# Patient Record
Sex: MALE | Race: WHITE | NOT HISPANIC OR LATINO | Employment: OTHER | URBAN - METROPOLITAN AREA
[De-identification: names, ages, dates, MRNs, and addresses within clinical notes are randomized per-mention and may not be internally consistent; named-entity substitution may affect disease eponyms.]

---

## 2023-05-18 ENCOUNTER — OFFICE VISIT (OUTPATIENT)
Dept: PRIMARY CARE | Facility: CLINIC | Age: 88
End: 2023-05-18
Payer: MEDICARE

## 2023-05-18 VITALS
DIASTOLIC BLOOD PRESSURE: 72 MMHG | OXYGEN SATURATION: 98 % | TEMPERATURE: 98.4 F | HEART RATE: 91 BPM | BODY MASS INDEX: 25.54 KG/M2 | WEIGHT: 182.4 LBS | SYSTOLIC BLOOD PRESSURE: 139 MMHG | HEIGHT: 71 IN

## 2023-05-18 DIAGNOSIS — I65.23 BILATERAL CAROTID ARTERY STENOSIS: ICD-10-CM

## 2023-05-18 DIAGNOSIS — I48.0 PAROXYSMAL ATRIAL FIBRILLATION (MULTI): ICD-10-CM

## 2023-05-18 DIAGNOSIS — I10 ESSENTIAL HYPERTENSION: ICD-10-CM

## 2023-05-18 DIAGNOSIS — I50.32 CHRONIC DIASTOLIC CONGESTIVE HEART FAILURE (MULTI): ICD-10-CM

## 2023-05-18 DIAGNOSIS — H53.8 BLURRY VISION, RIGHT EYE: Primary | ICD-10-CM

## 2023-05-18 DIAGNOSIS — N18.4 STAGE 4 CHRONIC KIDNEY DISEASE (MULTI): ICD-10-CM

## 2023-05-18 PROBLEM — U07.1 COVID-19: Status: ACTIVE | Noted: 2022-07-19

## 2023-05-18 PROBLEM — I71.40 AAA (ABDOMINAL AORTIC ANEURYSM) (CMS-HCC): Status: ACTIVE | Noted: 2023-05-18

## 2023-05-18 PROBLEM — D50.9 ANEMIA, IRON DEFICIENCY: Status: ACTIVE | Noted: 2023-05-18

## 2023-05-18 PROBLEM — R55 SYNCOPE: Status: ACTIVE | Noted: 2022-07-18

## 2023-05-18 PROBLEM — E78.00 HYPERCHOLESTEROLEMIA: Status: ACTIVE | Noted: 2023-05-18

## 2023-05-18 PROBLEM — E55.9 VITAMIN D DEFICIENCY: Status: ACTIVE | Noted: 2023-05-18

## 2023-05-18 PROBLEM — I50.30: Status: ACTIVE | Noted: 2022-07-19

## 2023-05-18 PROBLEM — I72.3 ANEURYSM OF LEFT COMMON ILIAC ARTERY (CMS-HCC): Status: ACTIVE | Noted: 2023-05-18

## 2023-05-18 PROBLEM — C61: Status: ACTIVE | Noted: 2023-05-18

## 2023-05-18 PROBLEM — K44.9 HIATAL HERNIA: Status: ACTIVE | Noted: 2023-05-18

## 2023-05-18 PROBLEM — I45.10 COMPLETE RIGHT BUNDLE BRANCH BLOCK: Status: ACTIVE | Noted: 2023-05-18

## 2023-05-18 PROCEDURE — 1159F MED LIST DOCD IN RCRD: CPT | Performed by: FAMILY MEDICINE

## 2023-05-18 PROCEDURE — 1157F ADVNC CARE PLAN IN RCRD: CPT | Performed by: FAMILY MEDICINE

## 2023-05-18 PROCEDURE — 1036F TOBACCO NON-USER: CPT | Performed by: FAMILY MEDICINE

## 2023-05-18 PROCEDURE — 1160F RVW MEDS BY RX/DR IN RCRD: CPT | Performed by: FAMILY MEDICINE

## 2023-05-18 PROCEDURE — 3078F DIAST BP <80 MM HG: CPT | Performed by: FAMILY MEDICINE

## 2023-05-18 PROCEDURE — 3075F SYST BP GE 130 - 139MM HG: CPT | Performed by: FAMILY MEDICINE

## 2023-05-18 PROCEDURE — 99214 OFFICE O/P EST MOD 30 MIN: CPT | Performed by: FAMILY MEDICINE

## 2023-05-18 RX ORDER — FUROSEMIDE 40 MG/1
TABLET ORAL
COMMUNITY
Start: 2018-01-15 | End: 2024-05-24 | Stop reason: SDUPTHER

## 2023-05-18 RX ORDER — LISINOPRIL 2.5 MG/1
TABLET ORAL
COMMUNITY
Start: 2018-01-15

## 2023-05-18 RX ORDER — LACTULOSE 10 G/15ML
SOLUTION ORAL; RECTAL
COMMUNITY
Start: 2021-05-13 | End: 2023-12-22

## 2023-05-18 RX ORDER — ATORVASTATIN CALCIUM 40 MG/1
TABLET, FILM COATED ORAL
COMMUNITY
Start: 2017-12-05 | End: 2024-02-08 | Stop reason: SDUPTHER

## 2023-05-18 RX ORDER — NEOMYCIN SULFATE, POLYMYXIN B SULFATE, HYDROCORTISONE 3.5; 10000; 1 MG/ML; [USP'U]/ML; MG/ML
SOLUTION/ DROPS AURICULAR (OTIC) 4 TIMES DAILY
COMMUNITY
Start: 2022-08-30 | End: 2023-12-22

## 2023-05-18 RX ORDER — METOPROLOL SUCCINATE 50 MG/1
TABLET, EXTENDED RELEASE ORAL
COMMUNITY
Start: 2018-01-15 | End: 2024-02-20 | Stop reason: SDUPTHER

## 2023-05-18 ASSESSMENT — LIFESTYLE VARIABLES
HOW OFTEN DO YOU HAVE SIX OR MORE DRINKS ON ONE OCCASION: NEVER
HOW OFTEN DO YOU HAVE A DRINK CONTAINING ALCOHOL: NEVER

## 2023-05-18 ASSESSMENT — PATIENT HEALTH QUESTIONNAIRE - PHQ9
2. FEELING DOWN, DEPRESSED OR HOPELESS: NOT AT ALL
SUM OF ALL RESPONSES TO PHQ9 QUESTIONS 1 & 2: 0
1. LITTLE INTEREST OR PLEASURE IN DOING THINGS: NOT AT ALL

## 2023-05-18 NOTE — PROGRESS NOTES
Subjective   Patient ID: Jase Amato is a 87 y.o. male who presents for Follow-up (Patient is here for 6 month follow up and to discuss ED visit).  HPI  87-year-old male for 6-month follow-up.  Multiple complaints.  Due for refill on medication for hypertension.  Also having vision changes in his right eye.  Review of Systems  Constitutional: no chills, no fever and no night sweats.   Eyes: right side blurred vision.  ENT: no hearing loss, no nasal congestion, no nasal discharge, no hoarseness and no sore throat.   Cardiovascular: no chest pain, no intermittent leg claudication, no lower extremity edema, no palpitations and no syncope.   Respiratory: no cough, no shortness of breath during exertion, no shortness of breath at rest and no wheezing.   Gastrointestinal: no abdominal pain, no blood in stools, no constipation, no diarrhea, no melena, no nausea, no rectal pain and no vomiting.   Genitourinary: no dysuria, no change in urinary frequency, no urinary hesitancy and no feelings of urinary urgency.   Neurological: no difficulty walking, no headache, no limb weakness, no numbness and no tingling.   Psychiatric: no anxiety, no depression, no anhedonia and no substance use disorders.   Endocrine: no recent weight gain and no recent weight loss.   Hematologic/Lymphatic: no tendency for easy bruising and no swollen glands.  Visit Vitals  /72 (BP Location: Right arm, Patient Position: Sitting, BP Cuff Size: Adult)   Pulse 91   Temp 36.9 °C (98.4 °F) (Temporal)        Objective   Physical Exam  Constitutional: Alert and in no acute distress. Well developed, well nourished.   Eyes: Pupils were equal in size, round, reactive to light (PERRL) with normal accommodation and extraocular movements intact (EOMI). Ophthalmoscopic examination: Normal.   Ears, Nose, Mouth, and Throat: External inspection of ears and nose: Normal. Otoscopic examination: Normal. Lips, teeth, and gums: Normal. Oropharynx: Normal.    Neck: No neck mass was observed. Supple. Thyroid not enlarged and there were no palpable thyroid nodules.   Cardiovascular: Heart rate and rhythm were normal, normal S1 and S2, no gallops, no murmurs and no pericardial rub. Carotid pulses: Normal with no bruits. Abdominal aorta: Normal. Pedal pulses: Normal. No peripheral edema.   Pulmonary: No respiratory distress. Clear bilateral breath sounds.   Abdomen: Soft nontender; no abdominal mass palpated. Normal bowel sounds. No organomegaly.   Skin: Normal skin color and pigmentation, normal skin turgor, and no rash.   Psychiatric: Judgment and insight: Intact. Mood and affect: Normal.  Assessment/Plan   Problem List Items Addressed This Visit          Circulatory    Bilateral carotid artery stenosis    Chronic diastolic congestive heart failure (CMS/HCC)    Relevant Medications    metoprolol succinate XL (Toprol-XL) 50 mg 24 hr tablet    Paroxysmal atrial fibrillation (CMS/HCC)    Relevant Medications    metoprolol succinate XL (Toprol-XL) 50 mg 24 hr tablet    Essential hypertension       Genitourinary    Stage 4 chronic kidney disease (CMS/HCC)       Other    Blurry vision, right eye - Primary    Relevant Orders    Referral to Ophthalmology

## 2023-05-22 ENCOUNTER — PATIENT OUTREACH (OUTPATIENT)
Dept: CARE COORDINATION | Facility: CLINIC | Age: 88
End: 2023-05-22
Payer: MEDICARE

## 2023-05-25 ENCOUNTER — TELEPHONE (OUTPATIENT)
Dept: PRIMARY CARE | Facility: CLINIC | Age: 88
End: 2023-05-25
Payer: MEDICARE

## 2023-05-25 NOTE — TELEPHONE ENCOUNTER
Pt was here last week.  Started feeling something in stomach.  Feels very weak, no chest pain etc.  Bp varying from 143/71,   146/64, 168/108.   He is having a lot of trouble sleeping.  Up  All night.  Taking melatonin 3 mg. & started back on escitalopram  10 mg.     Varinder [phone]  No allergies

## 2023-09-28 DIAGNOSIS — D50.9 IRON DEFICIENCY ANEMIA, UNSPECIFIED IRON DEFICIENCY ANEMIA TYPE: Primary | ICD-10-CM

## 2023-09-28 RX ORDER — FAMOTIDINE 10 MG/ML
20 INJECTION INTRAVENOUS ONCE AS NEEDED
Status: CANCELLED | OUTPATIENT
Start: 2023-10-10

## 2023-09-28 RX ORDER — DIPHENHYDRAMINE HYDROCHLORIDE 50 MG/ML
50 INJECTION INTRAMUSCULAR; INTRAVENOUS AS NEEDED
Status: CANCELLED | OUTPATIENT
Start: 2023-10-10

## 2023-09-28 RX ORDER — ALBUTEROL SULFATE 0.83 MG/ML
3 SOLUTION RESPIRATORY (INHALATION) AS NEEDED
Status: CANCELLED | OUTPATIENT
Start: 2023-10-10

## 2023-09-28 RX ORDER — EPINEPHRINE 0.3 MG/.3ML
0.3 INJECTION SUBCUTANEOUS EVERY 5 MIN PRN
Status: CANCELLED | OUTPATIENT
Start: 2023-10-10

## 2023-09-28 RX ORDER — METHYLPREDNISOLONE SODIUM SUCCINATE 40 MG/ML
40 INJECTION INTRAMUSCULAR; INTRAVENOUS AS NEEDED
Status: CANCELLED | OUTPATIENT
Start: 2023-10-10

## 2023-10-09 RX ORDER — ESCITALOPRAM OXALATE 10 MG/1
10 TABLET ORAL DAILY
COMMUNITY
Start: 2023-07-29 | End: 2023-10-25 | Stop reason: SDUPTHER

## 2023-10-09 RX ORDER — LATANOPROST 50 UG/ML
1 SOLUTION/ DROPS OPHTHALMIC NIGHTLY
COMMUNITY
Start: 2023-08-13 | End: 2023-12-22

## 2023-10-10 ENCOUNTER — INFUSION (OUTPATIENT)
Dept: HEMATOLOGY/ONCOLOGY | Facility: CLINIC | Age: 88
End: 2023-10-10
Payer: MEDICARE

## 2023-10-10 VITALS
DIASTOLIC BLOOD PRESSURE: 77 MMHG | BODY MASS INDEX: 25.03 KG/M2 | RESPIRATION RATE: 16 BRPM | OXYGEN SATURATION: 97 % | HEART RATE: 66 BPM | SYSTOLIC BLOOD PRESSURE: 169 MMHG | WEIGHT: 179.45 LBS | TEMPERATURE: 98.4 F

## 2023-10-10 DIAGNOSIS — D50.9 IRON DEFICIENCY ANEMIA, UNSPECIFIED IRON DEFICIENCY ANEMIA TYPE: ICD-10-CM

## 2023-10-10 PROCEDURE — 96365 THER/PROPH/DIAG IV INF INIT: CPT

## 2023-10-10 PROCEDURE — 2500000004 HC RX 250 GENERAL PHARMACY W/ HCPCS (ALT 636 FOR OP/ED): Mod: JZ | Performed by: INTERNAL MEDICINE

## 2023-10-10 RX ORDER — FAMOTIDINE 10 MG/ML
20 INJECTION INTRAVENOUS ONCE AS NEEDED
Status: DISCONTINUED | OUTPATIENT
Start: 2023-10-10 | End: 2023-10-10 | Stop reason: HOSPADM

## 2023-10-10 RX ORDER — DIPHENHYDRAMINE HYDROCHLORIDE 50 MG/ML
50 INJECTION INTRAMUSCULAR; INTRAVENOUS AS NEEDED
Status: CANCELLED | OUTPATIENT
Start: 2023-10-17

## 2023-10-10 RX ORDER — DIPHENHYDRAMINE HYDROCHLORIDE 50 MG/ML
50 INJECTION INTRAMUSCULAR; INTRAVENOUS AS NEEDED
Status: DISCONTINUED | OUTPATIENT
Start: 2023-10-10 | End: 2023-10-10 | Stop reason: HOSPADM

## 2023-10-10 RX ORDER — EPINEPHRINE 0.3 MG/.3ML
0.3 INJECTION SUBCUTANEOUS EVERY 5 MIN PRN
Status: DISCONTINUED | OUTPATIENT
Start: 2023-10-10 | End: 2023-10-10 | Stop reason: HOSPADM

## 2023-10-10 RX ORDER — ALBUTEROL SULFATE 0.83 MG/ML
3 SOLUTION RESPIRATORY (INHALATION) AS NEEDED
Status: CANCELLED | OUTPATIENT
Start: 2023-10-17

## 2023-10-10 RX ORDER — ALBUTEROL SULFATE 0.83 MG/ML
3 SOLUTION RESPIRATORY (INHALATION) AS NEEDED
Status: DISCONTINUED | OUTPATIENT
Start: 2023-10-10 | End: 2023-10-10 | Stop reason: HOSPADM

## 2023-10-10 RX ORDER — FAMOTIDINE 10 MG/ML
20 INJECTION INTRAVENOUS ONCE AS NEEDED
Status: CANCELLED | OUTPATIENT
Start: 2023-10-17

## 2023-10-10 RX ORDER — EPINEPHRINE 0.3 MG/.3ML
0.3 INJECTION SUBCUTANEOUS EVERY 5 MIN PRN
Status: CANCELLED | OUTPATIENT
Start: 2023-10-17

## 2023-10-10 RX ADMIN — FERUMOXYTOL 510 MG: 510 INJECTION INTRAVENOUS at 14:25

## 2023-10-10 ASSESSMENT — PATIENT HEALTH QUESTIONNAIRE - PHQ9
9. THOUGHTS THAT YOU WOULD BE BETTER OFF DEAD, OR OF HURTING YOURSELF: NOT AT ALL
8. MOVING OR SPEAKING SO SLOWLY THAT OTHER PEOPLE COULD HAVE NOTICED. OR THE OPPOSITE, BEING SO FIGETY OR RESTLESS THAT YOU HAVE BEEN MOVING AROUND A LOT MORE THAN USUAL: NOT AT ALL
1. LITTLE INTEREST OR PLEASURE IN DOING THINGS: NOT AT ALL
3. TROUBLE FALLING OR STAYING ASLEEP OR SLEEPING TOO MUCH: NOT AT ALL
2. FEELING DOWN, DEPRESSED, IRRITABLE, OR HOPELESS: NOT AT ALL
3. TROUBLE FALLING OR STAYING ASLEEP OR SLEEPING TOO MUCH: 0
10. IF YOU CHECKED OFF ANY PROBLEMS, HOW DIFFICULT HAVE THESE PROBLEMS MADE IT FOR YOU TO DO YOUR WORK, TAKE CARE OF THINGS AT HOME, OR GET ALONG WITH OTHER PEOPLE: NOT DIFFICULT AT ALL
2. FEELING DOWN, DEPRESSED OR HOPELESS: NOT AT ALL
9. THOUGHTS THAT YOU WOULD BE BETTER OFF DEAD, OR OF HURTING YOURSELF: 0
SUM OF ALL RESPONSES TO PHQ QUESTIONS 1-9: 0
10. IF YOU CHECKED OFF ANY PROBLEMS, HOW DIFFICULT HAVE THESE PROBLEMS MADE IT FOR YOU TO DO YOUR WORK, TAKE CARE OF THINGS AT HOME, OR GET ALONG WITH OTHER PEOPLE: NOT DIFFICULT AT ALL
5. POOR APPETITE OR OVEREATING: 0
6. FEELING BAD ABOUT YOURSELF - OR THAT YOU ARE A FAILURE OR HAVE LET YOURSELF OR YOUR FAMILY DOWN: NOT AT ALL
1. LITTLE INTEREST OR PLEASURE IN DOING THINGS: NOT AT ALL
5. POOR APPETITE OR OVEREATING: NOT AT ALL
4. FEELING TIRED OR HAVING LITTLE ENERGY: 0
2. FEELING DOWN, DEPRESSED, IRRITABLE, OR HOPELESS: 0
6. FEELING BAD ABOUT YOURSELF - OR THAT YOU ARE A FAILURE OR HAVE LET YOURSELF OR YOUR FAMILY DOWN: 0
5. POOR APPETITE OR OVEREATING: NOT AT ALL
8. MOVING OR SPEAKING SO SLOWLY THAT OTHER PEOPLE COULD HAVE NOTICED. OR THE OPPOSITE, BEING SO FIGETY OR RESTLESS THAT YOU HAVE BEEN MOVING AROUND A LOT MORE THAN USUAL: 0
4. FEELING TIRED OR HAVING LITTLE ENERGY: NOT AT ALL
1. LITTLE INTEREST OR PLEASURE IN DOING THINGS: 0
1. LITTLE INTEREST OR PLEASURE IN DOING THINGS: NOT AT ALL
6. FEELING BAD ABOUT YOURSELF - OR THAT YOU ARE A FAILURE OR HAVE LET YOURSELF OR YOUR FAMILY DOWN: NOT AT ALL
9. THOUGHTS THAT YOU WOULD BE BETTER OFF DEAD, OR OF HURTING YOURSELF: NOT AT ALL
4. FEELING TIRED OR HAVING LITTLE ENERGY: NOT AT ALL
7. TROUBLE CONCENTRATING ON THINGS, SUCH AS READING THE NEWSPAPER OR WATCHING TELEVISION: NOT AT ALL
2. FEELING DOWN, DEPRESSED OR HOPELESS: NOT AT ALL
7. TROUBLE CONCENTRATING ON THINGS, SUCH AS READING THE NEWSPAPER OR WATCHING TELEVISION: 0
8. MOVING OR SPEAKING SO SLOWLY THAT OTHER PEOPLE COULD HAVE NOTICED. OR THE OPPOSITE, BEING SO FIGETY OR RESTLESS THAT YOU HAVE BEEN MOVING AROUND A LOT MORE THAN USUAL: NOT AT ALL
3. TROUBLE FALLING OR STAYING ASLEEP OR SLEEPING TOO MUCH: NOT AT ALL
SUM OF ALL RESPONSES TO PHQ9 QUESTIONS 1 AND 2: 0
7. TROUBLE CONCENTRATING ON THINGS, SUCH AS READING THE NEWSPAPER OR WATCHING TELEVISION: NOT AT ALL
SUM OF ALL RESPONSES TO PHQ QUESTIONS 1-9: 0

## 2023-10-10 ASSESSMENT — PAIN SCALES - GENERAL: PAINLEVEL: 0-NO PAIN

## 2023-10-10 ASSESSMENT — COLUMBIA-SUICIDE SEVERITY RATING SCALE - C-SSRS
1. IN THE PAST MONTH, HAVE YOU WISHED YOU WERE DEAD OR WISHED YOU COULD GO TO SLEEP AND NOT WAKE UP?: NO
6. HAVE YOU EVER DONE ANYTHING, STARTED TO DO ANYTHING, OR PREPARED TO DO ANYTHING TO END YOUR LIFE?: NO
2. HAVE YOU ACTUALLY HAD ANY THOUGHTS OF KILLING YOURSELF?: NO

## 2023-10-18 ENCOUNTER — INFUSION (OUTPATIENT)
Dept: HEMATOLOGY/ONCOLOGY | Facility: CLINIC | Age: 88
End: 2023-10-18
Payer: MEDICARE

## 2023-10-18 VITALS
SYSTOLIC BLOOD PRESSURE: 145 MMHG | HEART RATE: 64 BPM | OXYGEN SATURATION: 96 % | RESPIRATION RATE: 18 BRPM | DIASTOLIC BLOOD PRESSURE: 75 MMHG | TEMPERATURE: 97.7 F

## 2023-10-18 DIAGNOSIS — D50.8 OTHER IRON DEFICIENCY ANEMIA: ICD-10-CM

## 2023-10-18 DIAGNOSIS — D50.9 IRON DEFICIENCY ANEMIA, UNSPECIFIED IRON DEFICIENCY ANEMIA TYPE: ICD-10-CM

## 2023-10-18 PROCEDURE — 2500000004 HC RX 250 GENERAL PHARMACY W/ HCPCS (ALT 636 FOR OP/ED): Performed by: INTERNAL MEDICINE

## 2023-10-18 PROCEDURE — 96365 THER/PROPH/DIAG IV INF INIT: CPT

## 2023-10-18 RX ORDER — ALBUTEROL SULFATE 0.83 MG/ML
3 SOLUTION RESPIRATORY (INHALATION) AS NEEDED
OUTPATIENT
Start: 2023-10-24

## 2023-10-18 RX ORDER — ALBUTEROL SULFATE 0.83 MG/ML
3 SOLUTION RESPIRATORY (INHALATION) AS NEEDED
Status: DISCONTINUED | OUTPATIENT
Start: 2023-10-18 | End: 2023-10-18 | Stop reason: HOSPADM

## 2023-10-18 RX ORDER — EPINEPHRINE 1 MG/ML
0.3 INJECTION INTRAMUSCULAR; INTRAVENOUS; SUBCUTANEOUS EVERY 5 MIN PRN
Status: DISCONTINUED | OUTPATIENT
Start: 2023-10-18 | End: 2023-10-18 | Stop reason: HOSPADM

## 2023-10-18 RX ORDER — FAMOTIDINE 10 MG/ML
20 INJECTION INTRAVENOUS ONCE AS NEEDED
Status: DISCONTINUED | OUTPATIENT
Start: 2023-10-18 | End: 2023-10-18 | Stop reason: HOSPADM

## 2023-10-18 RX ORDER — HEPARIN SODIUM,PORCINE/PF 10 UNIT/ML
50 SYRINGE (ML) INTRAVENOUS AS NEEDED
Status: DISCONTINUED | OUTPATIENT
Start: 2023-10-18 | End: 2023-10-18 | Stop reason: HOSPADM

## 2023-10-18 RX ORDER — DIPHENHYDRAMINE HYDROCHLORIDE 50 MG/ML
50 INJECTION INTRAMUSCULAR; INTRAVENOUS AS NEEDED
OUTPATIENT
Start: 2023-10-24

## 2023-10-18 RX ORDER — HEPARIN 100 UNIT/ML
500 SYRINGE INTRAVENOUS AS NEEDED
OUTPATIENT
Start: 2023-10-18

## 2023-10-18 RX ORDER — EPINEPHRINE 0.3 MG/.3ML
0.3 INJECTION SUBCUTANEOUS EVERY 5 MIN PRN
OUTPATIENT
Start: 2023-10-24

## 2023-10-18 RX ORDER — HEPARIN 100 UNIT/ML
500 SYRINGE INTRAVENOUS AS NEEDED
Status: DISCONTINUED | OUTPATIENT
Start: 2023-10-18 | End: 2023-10-18 | Stop reason: HOSPADM

## 2023-10-18 RX ORDER — DIPHENHYDRAMINE HYDROCHLORIDE 50 MG/ML
50 INJECTION INTRAMUSCULAR; INTRAVENOUS AS NEEDED
Status: DISCONTINUED | OUTPATIENT
Start: 2023-10-18 | End: 2023-10-18 | Stop reason: HOSPADM

## 2023-10-18 RX ORDER — FAMOTIDINE 10 MG/ML
20 INJECTION INTRAVENOUS ONCE AS NEEDED
OUTPATIENT
Start: 2023-10-24

## 2023-10-18 RX ORDER — HEPARIN SODIUM,PORCINE/PF 10 UNIT/ML
50 SYRINGE (ML) INTRAVENOUS AS NEEDED
OUTPATIENT
Start: 2023-10-18

## 2023-10-18 RX ADMIN — FERUMOXYTOL 510 MG: 510 INJECTION INTRAVENOUS at 14:57

## 2023-10-18 ASSESSMENT — PATIENT HEALTH QUESTIONNAIRE - PHQ9
9. THOUGHTS THAT YOU WOULD BE BETTER OFF DEAD, OR OF HURTING YOURSELF: 0
2. FEELING DOWN, DEPRESSED, IRRITABLE, OR HOPELESS: 0
9. THOUGHTS THAT YOU WOULD BE BETTER OFF DEAD, OR OF HURTING YOURSELF: NOT AT ALL
8. MOVING OR SPEAKING SO SLOWLY THAT OTHER PEOPLE COULD HAVE NOTICED. OR THE OPPOSITE, BEING SO FIGETY OR RESTLESS THAT YOU HAVE BEEN MOVING AROUND A LOT MORE THAN USUAL: NOT AT ALL
5. POOR APPETITE OR OVEREATING: NOT AT ALL
1. LITTLE INTEREST OR PLEASURE IN DOING THINGS: NOT AT ALL
1. LITTLE INTEREST OR PLEASURE IN DOING THINGS: 0
1. LITTLE INTEREST OR PLEASURE IN DOING THINGS: NOT AT ALL
9. THOUGHTS THAT YOU WOULD BE BETTER OFF DEAD, OR OF HURTING YOURSELF: NOT AT ALL
6. FEELING BAD ABOUT YOURSELF - OR THAT YOU ARE A FAILURE OR HAVE LET YOURSELF OR YOUR FAMILY DOWN: NOT AT ALL
8. MOVING OR SPEAKING SO SLOWLY THAT OTHER PEOPLE COULD HAVE NOTICED. OR THE OPPOSITE, BEING SO FIGETY OR RESTLESS THAT YOU HAVE BEEN MOVING AROUND A LOT MORE THAN USUAL: NOT AT ALL
3. TROUBLE FALLING OR STAYING ASLEEP OR SLEEPING TOO MUCH: NOT AT ALL
6. FEELING BAD ABOUT YOURSELF - OR THAT YOU ARE A FAILURE OR HAVE LET YOURSELF OR YOUR FAMILY DOWN: 0
7. TROUBLE CONCENTRATING ON THINGS, SUCH AS READING THE NEWSPAPER OR WATCHING TELEVISION: NOT AT ALL
2. FEELING DOWN, DEPRESSED OR HOPELESS: NOT AT ALL
5. POOR APPETITE OR OVEREATING: 0
4. FEELING TIRED OR HAVING LITTLE ENERGY: 0
7. TROUBLE CONCENTRATING ON THINGS, SUCH AS READING THE NEWSPAPER OR WATCHING TELEVISION: NOT AT ALL
3. TROUBLE FALLING OR STAYING ASLEEP OR SLEEPING TOO MUCH: 0
2. FEELING DOWN, DEPRESSED, IRRITABLE, OR HOPELESS: NOT AT ALL
10. IF YOU CHECKED OFF ANY PROBLEMS, HOW DIFFICULT HAVE THESE PROBLEMS MADE IT FOR YOU TO DO YOUR WORK, TAKE CARE OF THINGS AT HOME, OR GET ALONG WITH OTHER PEOPLE: NOT DIFFICULT AT ALL
8. MOVING OR SPEAKING SO SLOWLY THAT OTHER PEOPLE COULD HAVE NOTICED. OR THE OPPOSITE, BEING SO FIGETY OR RESTLESS THAT YOU HAVE BEEN MOVING AROUND A LOT MORE THAN USUAL: 0
4. FEELING TIRED OR HAVING LITTLE ENERGY: NOT AT ALL
10. IF YOU CHECKED OFF ANY PROBLEMS, HOW DIFFICULT HAVE THESE PROBLEMS MADE IT FOR YOU TO DO YOUR WORK, TAKE CARE OF THINGS AT HOME, OR GET ALONG WITH OTHER PEOPLE: NOT DIFFICULT AT ALL
5. POOR APPETITE OR OVEREATING: NOT AT ALL
4. FEELING TIRED OR HAVING LITTLE ENERGY: NOT AT ALL
SUM OF ALL RESPONSES TO PHQ QUESTIONS 1-9: 0
SUM OF ALL RESPONSES TO PHQ QUESTIONS 1-9: 0
6. FEELING BAD ABOUT YOURSELF - OR THAT YOU ARE A FAILURE OR HAVE LET YOURSELF OR YOUR FAMILY DOWN: NOT AT ALL
7. TROUBLE CONCENTRATING ON THINGS, SUCH AS READING THE NEWSPAPER OR WATCHING TELEVISION: 0
3. TROUBLE FALLING OR STAYING ASLEEP OR SLEEPING TOO MUCH: NOT AT ALL

## 2023-10-18 ASSESSMENT — PAIN SCALES - GENERAL
PAINLEVEL: 0-NO PAIN
PAINLEVEL_OUTOF10: 0-NO PAIN

## 2023-10-25 DIAGNOSIS — F41.9 ANXIETY: Primary | ICD-10-CM

## 2023-10-25 RX ORDER — ESCITALOPRAM OXALATE 10 MG/1
10 TABLET ORAL DAILY
Qty: 90 TABLET | Refills: 0 | Status: SHIPPED | OUTPATIENT
Start: 2023-10-25 | End: 2024-02-06 | Stop reason: SDUPTHER

## 2023-11-07 PROBLEM — I10 HYPERTENSION: Status: RESOLVED | Noted: 2023-05-18 | Resolved: 2023-11-07

## 2023-11-08 ENCOUNTER — OFFICE VISIT (OUTPATIENT)
Dept: CARDIOLOGY | Facility: CLINIC | Age: 88
End: 2023-11-08
Payer: MEDICARE

## 2023-11-08 VITALS
DIASTOLIC BLOOD PRESSURE: 78 MMHG | BODY MASS INDEX: 25.37 KG/M2 | OXYGEN SATURATION: 98 % | HEART RATE: 50 BPM | HEIGHT: 71 IN | WEIGHT: 181.2 LBS | SYSTOLIC BLOOD PRESSURE: 120 MMHG

## 2023-11-08 DIAGNOSIS — E78.00 HYPERCHOLESTEROLEMIA: ICD-10-CM

## 2023-11-08 DIAGNOSIS — I48.0 PAROXYSMAL ATRIAL FIBRILLATION (MULTI): Primary | ICD-10-CM

## 2023-11-08 DIAGNOSIS — I45.10 COMPLETE RIGHT BUNDLE BRANCH BLOCK: ICD-10-CM

## 2023-11-08 DIAGNOSIS — I10 ESSENTIAL HYPERTENSION: ICD-10-CM

## 2023-11-08 DIAGNOSIS — I50.32 CHRONIC DIASTOLIC CONGESTIVE HEART FAILURE (MULTI): ICD-10-CM

## 2023-11-08 DIAGNOSIS — I42.9 CARDIOMYOPATHY, UNSPECIFIED TYPE (MULTI): ICD-10-CM

## 2023-11-08 PROCEDURE — 93000 ELECTROCARDIOGRAM COMPLETE: CPT | Performed by: INTERNAL MEDICINE

## 2023-11-08 PROCEDURE — 1160F RVW MEDS BY RX/DR IN RCRD: CPT | Performed by: INTERNAL MEDICINE

## 2023-11-08 PROCEDURE — 1036F TOBACCO NON-USER: CPT | Performed by: INTERNAL MEDICINE

## 2023-11-08 PROCEDURE — 3078F DIAST BP <80 MM HG: CPT | Performed by: INTERNAL MEDICINE

## 2023-11-08 PROCEDURE — 1126F AMNT PAIN NOTED NONE PRSNT: CPT | Performed by: INTERNAL MEDICINE

## 2023-11-08 PROCEDURE — 3074F SYST BP LT 130 MM HG: CPT | Performed by: INTERNAL MEDICINE

## 2023-11-08 PROCEDURE — 99214 OFFICE O/P EST MOD 30 MIN: CPT | Performed by: INTERNAL MEDICINE

## 2023-11-08 PROCEDURE — 1159F MED LIST DOCD IN RCRD: CPT | Performed by: INTERNAL MEDICINE

## 2023-11-08 NOTE — PROGRESS NOTES
"  Subjective  Jase Amato  is a 88 y.o. year old male who presents for Nery a lot better after his iron shots.  He has nted recurrent Afib episodes cesar since 3/23/23.  Subesquently felt odds    Blood pressure 120/78, pulse 50, height 1.803 m (5' 11\"), weight 82.2 kg (181 lb 3.2 oz), SpO2 98 %.   Patient has no known allergies.  Past Medical History:   Diagnosis Date    Chronic systolic (congestive) heart failure (CMS/HCC)     Chronic systolic congestive heart failure    Dilated cardiomyopathy (CMS/HCC)     Secondary dilated cardiomyopathy    Dizziness and giddiness     Light headedness    Encounter for follow-up examination after completed treatment for conditions other than malignant neoplasm 08/09/2022    Hospital discharge follow-up    Encounter for preprocedural laboratory examination     Pre-procedure lab exam    Essential (primary) hypertension     Benign essential HTN    Gastrointestinal hemorrhage, unspecified 11/04/2021    Chronic upper gastrointestinal bleeding    Generalized anxiety disorder 08/30/2022    Anxiety, generalized    Impacted cerumen, left ear 08/30/2022    Hearing loss due to cerumen impaction, left    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    Iron deficiency anemia due to chronic blood loss    Personal history of diseases of the blood and blood-forming organs and certain disorders involving the immune mechanism 09/02/2021    History of anemia    Personal history of diseases of the blood and blood-forming organs and certain disorders involving the immune mechanism 05/09/2018    History of iron deficiency anemia    Personal history of malignant neoplasm of prostate     History of malignant neoplasm of prostate    Personal history of other benign neoplasm 11/04/2021    History of other benign neoplasm    Personal history of other diseases of the circulatory system 08/11/2022    History of carotid artery stenosis    Personal history of other diseases of the " circulatory system     History of cardiomyopathy    Personal history of other diseases of the circulatory system     Atrial fibrillation, currently in sinus rhythm    Personal history of other diseases of the circulatory system     History of atrial fibrillation    Personal history of other diseases of the nervous system and sense organs 08/30/2022    History of acute otitis externa    Personal history of other endocrine, nutritional and metabolic disease     History of hyperlipidemia    Personal history of other specified conditions 05/18/2022    History of palpitations    Personal history of other specified conditions     History of urinary retention    Unspecified fall, subsequent encounter 01/06/2022    Accidental fall, subsequent encounter     Past Surgical History:   Procedure Laterality Date    MR HEAD ANGIO WO IV CONTRAST  7/28/2022    MR HEAD ANGIO WO IV CONTRAST 7/28/2022 [facility]    MR NECK ANGIO WO IV CONTRAST  7/28/2022    MR NECK ANGIO WO IV CONTRAST 7/28/2022 [facility]    PROSTATE SURGERY  01/25/2018    Prostate Surgery     No family history on file.  @[facility]    Current Outpatient Medications   Medication Sig Dispense Refill    atorvastatin (Lipitor) 40 mg tablet Take by mouth.      escitalopram (Lexapro) 10 mg tablet Take 1 tablet (10 mg) by mouth once daily. 90 tablet 0    furosemide (Lasix) 40 mg tablet Take by mouth.      lactulose 20 gram/30 mL oral solution Take by mouth.      latanoprost (Xalatan) 0.005 % ophthalmic solution Administer 1 drop into both eyes once daily at bedtime.      lisinopril 2.5 mg tablet Take by mouth.      metoprolol succinate XL (Toprol-XL) 50 mg 24 hr tablet Take by mouth.      neomycin-polymyxin-HC (Cortisporin) otic solution Administer into affected ear(s) 4 times a day.      rivaroxaban (Xarelto) 20 mg tablet Take by mouth.       No current facility-administered medications for this visit.        ROS  Review of Systems   All other systems reviewed  and are negative.      Physical Exam  Physical Exam  Constitutional:       Appearance: Normal appearance.   Eyes:      Extraocular Movements: Extraocular movements intact.      Pupils: Pupils are equal, round, and reactive to light.   Cardiovascular:      Rate and Rhythm: Normal rate and regular rhythm.      Heart sounds: S1 normal.      Comments: S2 widely split  Abdominal:      Palpations: Abdomen is soft.   Musculoskeletal:      Right lower leg: No edema.      Left lower leg: No edema.   Neurological:      Mental Status: He is alert.          EKG  Encounter Date: 11/08/23   ECG 12 Lead    Narrative    Sinus bradycard at 50/min.,occ. PVC's RBBB       Problem List Items Addressed This Visit       Chronic diastolic congestive heart failure (CMS/HCC)     7/29/22 echocardioram LVEF = 65-70%, mild to moderate REI         Complete right bundle branch block    Hypercholesterolemia    Paroxysmal atrial fibrillation (CMS/HCC) - Primary     Sinus bradycardia today         Relevant Orders    ECG 12 Lead (Completed)    Follow Up In Cardiology    Essential hypertension    Cardiomyopathy (CMS/HCC)     Tacchycardia related 4/2015 resolved on echocardiogram of 4/2015, exercie thallium 2013 negative scan              No follow-ups on file.      Mike Gutiérrez MD

## 2023-11-09 PROBLEM — I42.9 CARDIOMYOPATHY (MULTI): Status: ACTIVE | Noted: 2023-11-09

## 2023-11-09 NOTE — ASSESSMENT & PLAN NOTE
Tacchycardia related 4/2015 resolved on echocardiogram of 4/2015, exercie thallium 2013 negative scan

## 2023-11-21 ENCOUNTER — APPOINTMENT (OUTPATIENT)
Dept: PRIMARY CARE | Facility: CLINIC | Age: 88
End: 2023-11-21
Payer: MEDICARE

## 2023-12-15 ENCOUNTER — LAB (OUTPATIENT)
Dept: LAB | Facility: CLINIC | Age: 88
End: 2023-12-15
Payer: MEDICARE

## 2023-12-15 DIAGNOSIS — D50.8 IRON DEFICIENCY ANEMIA SECONDARY TO INADEQUATE DIETARY IRON INTAKE: ICD-10-CM

## 2023-12-15 LAB
ALBUMIN SERPL BCP-MCNC: 4 G/DL (ref 3.4–5)
ALP SERPL-CCNC: 73 U/L (ref 33–136)
ALT SERPL W P-5'-P-CCNC: 17 U/L (ref 10–52)
ANION GAP SERPL CALC-SCNC: 10 MMOL/L (ref 10–20)
AST SERPL W P-5'-P-CCNC: 16 U/L (ref 9–39)
BASOPHILS # BLD AUTO: 0.02 X10*3/UL (ref 0–0.1)
BASOPHILS NFR BLD AUTO: 0.4 %
BILIRUB SERPL-MCNC: 0.6 MG/DL (ref 0–1.2)
BUN SERPL-MCNC: 27 MG/DL (ref 6–23)
CALCIUM SERPL-MCNC: 8.9 MG/DL (ref 8.6–10.3)
CHLORIDE SERPL-SCNC: 104 MMOL/L (ref 98–107)
CO2 SERPL-SCNC: 27 MMOL/L (ref 21–32)
CREAT SERPL-MCNC: 1.14 MG/DL (ref 0.5–1.3)
EOSINOPHIL # BLD AUTO: 0.06 X10*3/UL (ref 0–0.4)
EOSINOPHIL NFR BLD AUTO: 1.1 %
ERYTHROCYTE [DISTWIDTH] IN BLOOD BY AUTOMATED COUNT: 14.6 % (ref 11.5–14.5)
FERRITIN SERPL-MCNC: 43 NG/ML (ref 20–300)
GFR SERPL CREATININE-BSD FRML MDRD: 62 ML/MIN/1.73M*2
GLUCOSE SERPL-MCNC: 109 MG/DL (ref 74–99)
HCT VFR BLD AUTO: 36.8 % (ref 41–52)
HGB BLD-MCNC: 12.3 G/DL (ref 13.5–17.5)
IMM GRANULOCYTES # BLD AUTO: 0.02 X10*3/UL (ref 0–0.5)
IMM GRANULOCYTES NFR BLD AUTO: 0.4 % (ref 0–0.9)
IRON SATN MFR SERPL: 32 % (ref 25–45)
IRON SERPL-MCNC: 110 UG/DL (ref 35–150)
LYMPHOCYTES # BLD AUTO: 1.23 X10*3/UL (ref 0.8–3)
LYMPHOCYTES NFR BLD AUTO: 22.4 %
MCH RBC QN AUTO: 33.6 PG (ref 26–34)
MCHC RBC AUTO-ENTMCNC: 33.4 G/DL (ref 32–36)
MCV RBC AUTO: 101 FL (ref 80–100)
MONOCYTES # BLD AUTO: 0.51 X10*3/UL (ref 0.05–0.8)
MONOCYTES NFR BLD AUTO: 9.3 %
NEUTROPHILS # BLD AUTO: 3.65 X10*3/UL (ref 1.6–5.5)
NEUTROPHILS NFR BLD AUTO: 66.4 %
NRBC BLD-RTO: 0 /100 WBCS (ref 0–0)
PLATELET # BLD AUTO: 222 X10*3/UL (ref 150–450)
POTASSIUM SERPL-SCNC: 4.7 MMOL/L (ref 3.5–5.3)
PROT SERPL-MCNC: 6.5 G/DL (ref 6.4–8.2)
RBC # BLD AUTO: 3.66 X10*6/UL (ref 4.5–5.9)
SODIUM SERPL-SCNC: 136 MMOL/L (ref 136–145)
TIBC SERPL-MCNC: 347 UG/DL (ref 240–445)
UIBC SERPL-MCNC: 237 UG/DL (ref 110–370)
WBC # BLD AUTO: 5.5 X10*3/UL (ref 4.4–11.3)

## 2023-12-15 PROCEDURE — 83550 IRON BINDING TEST: CPT | Performed by: INTERNAL MEDICINE

## 2023-12-15 PROCEDURE — 85025 COMPLETE CBC W/AUTO DIFF WBC: CPT | Performed by: INTERNAL MEDICINE

## 2023-12-15 PROCEDURE — 36415 COLL VENOUS BLD VENIPUNCTURE: CPT | Mod: PARLAB

## 2023-12-15 PROCEDURE — 84075 ASSAY ALKALINE PHOSPHATASE: CPT | Performed by: INTERNAL MEDICINE

## 2023-12-15 PROCEDURE — 82728 ASSAY OF FERRITIN: CPT | Mod: PARLAB | Performed by: INTERNAL MEDICINE

## 2023-12-22 ENCOUNTER — OFFICE VISIT (OUTPATIENT)
Dept: HEMATOLOGY/ONCOLOGY | Facility: CLINIC | Age: 88
End: 2023-12-22
Payer: MEDICARE

## 2023-12-22 VITALS
HEART RATE: 54 BPM | RESPIRATION RATE: 20 BRPM | BODY MASS INDEX: 26.07 KG/M2 | DIASTOLIC BLOOD PRESSURE: 78 MMHG | WEIGHT: 186.95 LBS | OXYGEN SATURATION: 92 % | TEMPERATURE: 97.9 F | SYSTOLIC BLOOD PRESSURE: 128 MMHG

## 2023-12-22 DIAGNOSIS — D50.8 OTHER IRON DEFICIENCY ANEMIA: Primary | ICD-10-CM

## 2023-12-22 PROCEDURE — 1126F AMNT PAIN NOTED NONE PRSNT: CPT | Performed by: INTERNAL MEDICINE

## 2023-12-22 PROCEDURE — 99213 OFFICE O/P EST LOW 20 MIN: CPT | Performed by: INTERNAL MEDICINE

## 2023-12-22 PROCEDURE — 1160F RVW MEDS BY RX/DR IN RCRD: CPT | Performed by: INTERNAL MEDICINE

## 2023-12-22 PROCEDURE — 1159F MED LIST DOCD IN RCRD: CPT | Performed by: INTERNAL MEDICINE

## 2023-12-22 PROCEDURE — 3078F DIAST BP <80 MM HG: CPT | Performed by: INTERNAL MEDICINE

## 2023-12-22 PROCEDURE — 3074F SYST BP LT 130 MM HG: CPT | Performed by: INTERNAL MEDICINE

## 2023-12-22 PROCEDURE — 1036F TOBACCO NON-USER: CPT | Performed by: INTERNAL MEDICINE

## 2023-12-22 ASSESSMENT — PAIN SCALES - GENERAL: PAINLEVEL: 0-NO PAIN

## 2023-12-22 NOTE — PROGRESS NOTES
LOCATION:  [facility] at [facility].     HEMATOLOGY & ONCOLOGY PROBLEMS:  1. Anemia      a. Iron deficiency due to poor oral iron absorption.      b. Maintained on intermittent IV iron infusions.  2. IgG Kappa monoclonal gammopathy (MGUS).     a. Currently being followed by close observation.       CHIEF COMPLAINT:    The patient is in the clinic today for management of anemia and monoclonal  gammopathy.                  HISTORY:   Mr. Amato is 88 year old pleasant gentleman with multiple medical problems, who has also been noted to have chronic anemia. He was admitted at [facility] in Dec 2017 with CHF complications  and was also noted to be profoundly anemic with iron deficiency. EGD mainly showed a hiatal hernia and a colonoscopy showed a tubular adenoma without any evidence of further bleeding lesions. He was supported with IV iron infusion with Venofer. Follow-up  blood work from April 2018 showed persistent anemia with hemoglobin of 11.3. Ferritin was low at 11 and creatinine was 1.5. Post discharge, he was maintained on oral iron but with suboptimal response. He has been supported with intermittent courses of  IV iron infusion as an outpatient with Feraheme with good response. He was also been noted to have incidental monoclonal gammopathy during evaluation for anemia. Workup was suggestive of MGUS and is being followed with close observation without any intervention.  He had repeat extensive GI evaluation in the recent past.  Colonoscopy was unremarkable.  He had capsule endoscopy which was concerning for small intestinal bleed and there was concern regarding small intestinal mass.  He eventually had a CT enterography  which was essentially unremarkable.     INTERVAL HISTORY:  Lately is feeling slightly tired and fatigued more.  Blood work results are suggestive of declining iron stores again.  As per the patient he may be having right eye surgery in the next few months  also.      PAST MEDICAL HISTORY:   1. CHF.  2. A. fib.  3. Right bundle-branch block.  4. Hypertension  5. Hyperlipidemia  6. Chronic kidney disease  7. GERD  8. Carotid stenosis  9. BPH s/p laser surgery.  10. History of prostate cancer status post external beam radiation treatment in .     SOCIAL HISTORY:    for 60 years and lives in [place]. Nonsmoker and nonalcoholic. He is a retired  from [employer].     FAMILY HISTORY:    Father  at age 60 from myocardial infarction. Mother  at age 90 from cancer and patient not aware of the details. No other  specific history of bleeding, clotting or malignant disorders in the family.     REVIEW OF SYSTEMS:  Pertinent finding as per the history above.  All other systems have been reviewed and generally negative and noncontributory.     PHYSICAL EXAMINATION:    Detailed examination not done.      ALLERGY & MEDICATIONS:  Allergies and latest outpatient medications list were reviewed in the EMR.    LAB RESULTS:  Latest CBC from 12/15/2023 showed a hemoglobin of 12.3 with MCV of 101.  WBC and platelets were normal. Ferritin was 43 and serum iron was 110.  Last 3 sets of blood work were reviewed and the trend was noted.     ASSESSMENT AND PLAN:   1. Anemia. Please refer to the details of initial workup and management as outlined above. In summary he has been noted to have persistent iron  deficiency of unclear etiology. GI workup including EGD and colonoscopy was unremarkable in Dec 2017. He was supported with a course of IV iron infusion with Venofer in Dec 2017 but follow up labs from 2018 showed persistent iron deficiency. Additional  hematological evaluation revealed normal vitamin B12, folate, LDH, SPEP, haptoglobin and reticulocyte count etc. He had repeat extensive GI evaluation in the recent past.  Colonoscopy was unremarkable.  He had capsule endoscopy which was concerning for  small intestinal bleed and there was concern regarding  small intestinal mass.  He eventually had a CT enterography which was essentially unremarkable. He has been supported with intermittent courses of IV iron infusion as an outpatient with Feraheme with  good response.      Latest hemoglobin and iron stores are fairly stable.  Clinically feels okay.  For now we will continue to follow him closely and will support him with IV iron infusion intermittently as needed.  As before I again advised him to continue to follow up with GI clinic as advised by Dr. Metz.     2. MGUS. He was noted to have an incidental finding of monoclonal gammopathy during anemia workup. Overall findings are suggestive of MGUS and he is being followed with close observation without any intervention.      3. Follow up:  Patient will followup with me in 3 months.  Advised to contact us immediately if there are any new questions or problems.        This note has been transcribed using Dragon voice recognition system and there is a possibility of unintentional typing misprints.

## 2024-02-05 PROBLEM — I42.0: Status: ACTIVE | Noted: 2023-11-09

## 2024-02-05 PROBLEM — Z85.46 HISTORY OF MALIGNANT NEOPLASM OF PROSTATE: Status: ACTIVE | Noted: 2023-05-16

## 2024-02-05 PROBLEM — D64.9 ANEMIA: Status: ACTIVE | Noted: 2023-05-19

## 2024-02-05 PROBLEM — R00.2 PALPITATIONS: Status: ACTIVE | Noted: 2023-05-16

## 2024-02-05 PROBLEM — G47.00 INSOMNIA: Status: ACTIVE | Noted: 2024-02-05

## 2024-02-05 PROBLEM — I65.29 STENOSIS OF CAROTID ARTERY: Status: ACTIVE | Noted: 2023-05-18

## 2024-02-05 PROBLEM — H60.509 ACUTE OTITIS EXTERNA: Status: ACTIVE | Noted: 2024-02-05

## 2024-02-05 PROBLEM — D12.6 TUBULAR ADENOMA OF COLON: Status: ACTIVE | Noted: 2024-02-05

## 2024-02-05 PROBLEM — R42 LIGHTHEADEDNESS: Status: ACTIVE | Noted: 2023-03-22

## 2024-02-06 DIAGNOSIS — F41.9 ANXIETY: ICD-10-CM

## 2024-02-06 RX ORDER — ESCITALOPRAM OXALATE 10 MG/1
10 TABLET ORAL DAILY
Qty: 90 TABLET | Refills: 0 | Status: SHIPPED | OUTPATIENT
Start: 2024-02-06 | End: 2024-05-06 | Stop reason: SDUPTHER

## 2024-02-08 ENCOUNTER — OFFICE VISIT (OUTPATIENT)
Dept: CARDIOLOGY | Facility: CLINIC | Age: 89
End: 2024-02-08
Payer: MEDICARE

## 2024-02-08 VITALS
WEIGHT: 190.4 LBS | BODY MASS INDEX: 26.65 KG/M2 | DIASTOLIC BLOOD PRESSURE: 82 MMHG | HEART RATE: 60 BPM | HEIGHT: 71 IN | SYSTOLIC BLOOD PRESSURE: 126 MMHG

## 2024-02-08 DIAGNOSIS — I10 ESSENTIAL HYPERTENSION: ICD-10-CM

## 2024-02-08 DIAGNOSIS — I45.10 COMPLETE RIGHT BUNDLE BRANCH BLOCK: ICD-10-CM

## 2024-02-08 DIAGNOSIS — I48.0 PAROXYSMAL ATRIAL FIBRILLATION (MULTI): ICD-10-CM

## 2024-02-08 DIAGNOSIS — C61 PROSTATIC CANCER (MULTI): ICD-10-CM

## 2024-02-08 DIAGNOSIS — I42.9 CARDIOMYOPATHY, UNSPECIFIED TYPE (MULTI): ICD-10-CM

## 2024-02-08 DIAGNOSIS — I50.32 CHRONIC DIASTOLIC CONGESTIVE HEART FAILURE (MULTI): Primary | ICD-10-CM

## 2024-02-08 PROCEDURE — 1159F MED LIST DOCD IN RCRD: CPT | Performed by: INTERNAL MEDICINE

## 2024-02-08 PROCEDURE — 1157F ADVNC CARE PLAN IN RCRD: CPT | Performed by: INTERNAL MEDICINE

## 2024-02-08 PROCEDURE — 3078F DIAST BP <80 MM HG: CPT | Performed by: INTERNAL MEDICINE

## 2024-02-08 PROCEDURE — 1036F TOBACCO NON-USER: CPT | Performed by: INTERNAL MEDICINE

## 2024-02-08 PROCEDURE — 1160F RVW MEDS BY RX/DR IN RCRD: CPT | Performed by: INTERNAL MEDICINE

## 2024-02-08 PROCEDURE — 3074F SYST BP LT 130 MM HG: CPT | Performed by: INTERNAL MEDICINE

## 2024-02-08 PROCEDURE — 1126F AMNT PAIN NOTED NONE PRSNT: CPT | Performed by: INTERNAL MEDICINE

## 2024-02-08 PROCEDURE — 93000 ELECTROCARDIOGRAM COMPLETE: CPT | Performed by: INTERNAL MEDICINE

## 2024-02-08 PROCEDURE — 99214 OFFICE O/P EST MOD 30 MIN: CPT | Performed by: INTERNAL MEDICINE

## 2024-02-08 RX ORDER — ATORVASTATIN CALCIUM 40 MG/1
40 TABLET, FILM COATED ORAL DAILY
Qty: 90 TABLET | Refills: 3 | Status: SHIPPED | OUTPATIENT
Start: 2024-02-08 | End: 2024-02-20 | Stop reason: SDUPTHER

## 2024-02-08 NOTE — PROGRESS NOTES
"  Subjective  Jase Amato  is a 88 y.o. year old male who presents for Paroxysmal strail fibrillation.  He had bilateral cataract extraction and his vision has improved and his dizziiness has resolved.  No chest pain. No palpaitions, no dyspnea, no edema.    Blood pressure 126/82, pulse 60, height 1.803 m (5' 11\"), weight 86.4 kg (190 lb 6.4 oz).   Patient has no known allergies.  Past Medical History:   Diagnosis Date    Chronic systolic (congestive) heart failure (CMS/HCC)     Chronic systolic congestive heart failure    Dilated cardiomyopathy (CMS/HCC)     Secondary dilated cardiomyopathy    Dizziness and giddiness     Light headedness    Encounter for follow-up examination after completed treatment for conditions other than malignant neoplasm 08/09/2022    Hospital discharge follow-up    Encounter for preprocedural laboratory examination     Pre-procedure lab exam    Essential (primary) hypertension     Benign essential HTN    Gastrointestinal hemorrhage, unspecified 11/04/2021    Chronic upper gastrointestinal bleeding    Generalized anxiety disorder 08/30/2022    Anxiety, generalized    Impacted cerumen, left ear 08/30/2022    Hearing loss due to cerumen impaction, left    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    Iron deficiency anemia due to chronic blood loss    Personal history of diseases of the blood and blood-forming organs and certain disorders involving the immune mechanism 09/02/2021    History of anemia    Personal history of diseases of the blood and blood-forming organs and certain disorders involving the immune mechanism 05/09/2018    History of iron deficiency anemia    Personal history of malignant neoplasm of prostate     History of malignant neoplasm of prostate    Personal history of other benign neoplasm 11/04/2021    History of other benign neoplasm    Personal history of other diseases of the circulatory system 08/11/2022    History of carotid artery " stenosis    Personal history of other diseases of the circulatory system     History of cardiomyopathy    Personal history of other diseases of the circulatory system     Atrial fibrillation, currently in sinus rhythm    Personal history of other diseases of the circulatory system     History of atrial fibrillation    Personal history of other diseases of the nervous system and sense organs 08/30/2022    History of acute otitis externa    Personal history of other endocrine, nutritional and metabolic disease     History of hyperlipidemia    Personal history of other specified conditions 05/18/2022    History of palpitations    Personal history of other specified conditions     History of urinary retention    Unspecified fall, subsequent encounter 01/06/2022    Accidental fall, subsequent encounter     Past Surgical History:   Procedure Laterality Date    MR HEAD ANGIO WO IV CONTRAST  7/28/2022    MR HEAD ANGIO WO IV CONTRAST 7/28/2022 [facility]    MR NECK ANGIO WO IV CONTRAST  7/28/2022    MR NECK ANGIO WO IV CONTRAST 7/28/2022 [facility]    PROSTATE SURGERY  01/25/2018    Prostate Surgery     No family history on file.  @[facility]    Current Outpatient Medications   Medication Sig Dispense Refill    atorvastatin (Lipitor) 40 mg tablet Take by mouth.      escitalopram (Lexapro) 10 mg tablet Take 1 tablet (10 mg) by mouth once daily. 90 tablet 0    furosemide (Lasix) 40 mg tablet Take by mouth.      lisinopril 2.5 mg tablet Take by mouth.      metoprolol succinate XL (Toprol-XL) 50 mg 24 hr tablet Take by mouth.      rivaroxaban (Xarelto) 20 mg tablet Take by mouth.       No current facility-administered medications for this visit.        ROS  Review of Systems   All other systems reviewed and are negative.      Physical Exam  Physical Exam  Constitutional:       Appearance: Normal appearance.   HENT:      Head: Normocephalic and atraumatic.   Eyes:      Extraocular Movements: Extraocular movements  intact.      Pupils: Pupils are equal, round, and reactive to light.   Cardiovascular:      Rate and Rhythm: Normal rate and regular rhythm.      Comments: S2 widely split  Pulmonary:      Effort: Pulmonary effort is normal.      Breath sounds: Normal breath sounds.   Abdominal:      General: Abdomen is flat.   Musculoskeletal:      Right lower leg: No edema.      Left lower leg: No edema.   Skin:     General: Skin is warm and dry.   Neurological:      General: No focal deficit present.      Mental Status: He is alert and oriented to person, place, and time.   Psychiatric:         Mood and Affect: Mood normal.         Behavior: Behavior normal.          EKG  Encounter Date: 02/08/24   ECG 12 Lead    Narrative    Sinus bradycardia at 50/min., RBBB       Problem List Items Addressed This Visit       Chronic diastolic congestive heart failure (CMS/HCC) - Primary     7/29/22 echocardogram LVEF = 65-70% with mild to moderate REI         Relevant Orders    Follow Up In Cardiology    Complete right bundle branch block    Paroxysmal atrial fibrillation (CMS/HCC)    Relevant Orders    ECG 12 Lead (Completed)    Follow Up In Cardiology    Prostatic cancer (CMS/HCC)    Essential hypertension    Cardiomyopathy (CMS/HCC)     Tachycardia related 4/20/15 resolved         Relevant Orders    Follow Up In Cardiology         Same meds with recheck EKG 3 months visit      Mike Gutiérrez MD

## 2024-02-19 DIAGNOSIS — I50.32 CHRONIC DIASTOLIC CONGESTIVE HEART FAILURE (MULTI): ICD-10-CM

## 2024-02-20 DIAGNOSIS — I48.0 PAROXYSMAL ATRIAL FIBRILLATION (MULTI): Primary | ICD-10-CM

## 2024-02-20 RX ORDER — METOPROLOL SUCCINATE 50 MG/1
50 TABLET, EXTENDED RELEASE ORAL DAILY
Qty: 90 TABLET | Refills: 3 | Status: SHIPPED | OUTPATIENT
Start: 2024-02-20

## 2024-02-20 RX ORDER — ATORVASTATIN CALCIUM 40 MG/1
40 TABLET, FILM COATED ORAL DAILY
Qty: 90 TABLET | Refills: 3 | Status: SHIPPED | OUTPATIENT
Start: 2024-02-20

## 2024-02-23 RX ORDER — RIVAROXABAN 20 MG/1
20 TABLET, FILM COATED ORAL DAILY
Qty: 90 TABLET | Refills: 3 | Status: SHIPPED | OUTPATIENT
Start: 2024-02-23

## 2024-03-05 ENCOUNTER — OFFICE VISIT (OUTPATIENT)
Dept: PRIMARY CARE | Facility: CLINIC | Age: 89
End: 2024-03-05
Payer: MEDICARE

## 2024-03-05 VITALS
WEIGHT: 194 LBS | DIASTOLIC BLOOD PRESSURE: 60 MMHG | SYSTOLIC BLOOD PRESSURE: 130 MMHG | OXYGEN SATURATION: 99 % | HEIGHT: 71 IN | BODY MASS INDEX: 27.16 KG/M2 | TEMPERATURE: 96.8 F | HEART RATE: 66 BPM

## 2024-03-05 DIAGNOSIS — I50.32 CHRONIC DIASTOLIC CONGESTIVE HEART FAILURE (MULTI): ICD-10-CM

## 2024-03-05 DIAGNOSIS — N18.4 STAGE 4 CHRONIC KIDNEY DISEASE (MULTI): ICD-10-CM

## 2024-03-05 DIAGNOSIS — I72.3 ANEURYSM OF LEFT COMMON ILIAC ARTERY (CMS-HCC): ICD-10-CM

## 2024-03-05 DIAGNOSIS — I48.0 PAROXYSMAL ATRIAL FIBRILLATION (MULTI): ICD-10-CM

## 2024-03-05 DIAGNOSIS — C61 PROSTATIC CANCER (MULTI): ICD-10-CM

## 2024-03-05 DIAGNOSIS — I71.40 ABDOMINAL AORTIC ANEURYSM (AAA) WITHOUT RUPTURE, UNSPECIFIED PART (CMS-HCC): ICD-10-CM

## 2024-03-05 DIAGNOSIS — D50.9 IRON DEFICIENCY ANEMIA, UNSPECIFIED IRON DEFICIENCY ANEMIA TYPE: ICD-10-CM

## 2024-03-05 DIAGNOSIS — I42.9 CARDIOMYOPATHY, UNSPECIFIED TYPE (MULTI): ICD-10-CM

## 2024-03-05 DIAGNOSIS — I42.0: ICD-10-CM

## 2024-03-05 DIAGNOSIS — Z00.00 ROUTINE GENERAL MEDICAL EXAMINATION AT HEALTH CARE FACILITY: Primary | ICD-10-CM

## 2024-03-05 PROCEDURE — G0444 DEPRESSION SCREEN ANNUAL: HCPCS | Performed by: FAMILY MEDICINE

## 2024-03-05 PROCEDURE — 99497 ADVNCD CARE PLAN 30 MIN: CPT | Performed by: FAMILY MEDICINE

## 2024-03-05 PROCEDURE — 1159F MED LIST DOCD IN RCRD: CPT | Performed by: FAMILY MEDICINE

## 2024-03-05 PROCEDURE — 3078F DIAST BP <80 MM HG: CPT | Performed by: FAMILY MEDICINE

## 2024-03-05 PROCEDURE — G0439 PPPS, SUBSEQ VISIT: HCPCS | Performed by: FAMILY MEDICINE

## 2024-03-05 PROCEDURE — 1126F AMNT PAIN NOTED NONE PRSNT: CPT | Performed by: FAMILY MEDICINE

## 2024-03-05 PROCEDURE — 99214 OFFICE O/P EST MOD 30 MIN: CPT | Performed by: FAMILY MEDICINE

## 2024-03-05 PROCEDURE — 1160F RVW MEDS BY RX/DR IN RCRD: CPT | Performed by: FAMILY MEDICINE

## 2024-03-05 PROCEDURE — 3075F SYST BP GE 130 - 139MM HG: CPT | Performed by: FAMILY MEDICINE

## 2024-03-05 PROCEDURE — 1036F TOBACCO NON-USER: CPT | Performed by: FAMILY MEDICINE

## 2024-03-05 PROCEDURE — 1157F ADVNC CARE PLAN IN RCRD: CPT | Performed by: FAMILY MEDICINE

## 2024-03-05 PROCEDURE — 1170F FXNL STATUS ASSESSED: CPT | Performed by: FAMILY MEDICINE

## 2024-03-05 ASSESSMENT — ENCOUNTER SYMPTOMS
DEPRESSION: 0
LOSS OF SENSATION IN FEET: 0
OCCASIONAL FEELINGS OF UNSTEADINESS: 0

## 2024-03-05 ASSESSMENT — PAIN SCALES - GENERAL: PAINLEVEL: 0-NO PAIN

## 2024-03-05 ASSESSMENT — ACTIVITIES OF DAILY LIVING (ADL)
MANAGING_FINANCES: INDEPENDENT
DRESSING: INDEPENDENT
TAKING_MEDICATION: INDEPENDENT
DOING_HOUSEWORK: INDEPENDENT
GROCERY_SHOPPING: INDEPENDENT
BATHING: INDEPENDENT

## 2024-03-05 ASSESSMENT — PATIENT HEALTH QUESTIONNAIRE - PHQ9
2. FEELING DOWN, DEPRESSED OR HOPELESS: NOT AT ALL
1. LITTLE INTEREST OR PLEASURE IN DOING THINGS: NOT AT ALL
SUM OF ALL RESPONSES TO PHQ9 QUESTIONS 1 AND 2: 0

## 2024-03-05 NOTE — PROGRESS NOTES
Subjective   Reason for Visit: Jase Amato is an 88 y.o. male here for a Medicare Wellness visit.     Past Medical, Surgical, and Family History reviewed and updated in chart.    Reviewed all medications by prescribing practitioner or clinical pharmacist (such as prescriptions, OTCs, herbal therapies and supplements) and documented in the medical record.    HPI  88-year-old male for Medicare wellness visit.  Checkup on hypertension, hyperlipidemia, atrial fibrillation.  Heart failure stable.  No shortness of breath or swelling.  He is active with cardiology.  Cardiomyopathy is stable.  He is active with hematology for iron deficiency anemia.  His blood counts have been good.  Chronic renal impairment stage IV is stable.  Kidney function is stable.  Patient Care Team:  Ludwin HEWITT DO as PCP - General  Ludwin HEWITT DO as PCP - [facility] Attributed Provider  Mike Gutiérrez MD as Consulting Physician (Cardiology)     Review of Systems  Constitutional: no chills, no fever and no night sweats.   Eyes: no blurred vision and no eyesight problems.   ENT: no hearing loss, no nasal congestion, no nasal discharge, no hoarseness and no sore throat.   Cardiovascular: no chest pain, no intermittent leg claudication, no lower extremity edema, no palpitations and no syncope.   Respiratory: no cough, no shortness of breath during exertion, no shortness of breath at rest and no wheezing.   Gastrointestinal: no abdominal pain, no blood in stools, no constipation, no diarrhea, no melena, no nausea, no rectal pain and no vomiting.   Genitourinary: no dysuria, no change in urinary frequency, no urinary hesitancy and no feelings of urinary urgency.   Neurological: no difficulty walking, no headache, no limb weakness, no numbness and no tingling.   Psychiatric: no anxiety, no depression, no anhedonia and no substance use disorders.   Endocrine: no recent weight gain and no recent weight loss.   Hematologic/Lymphatic:  "no tendency for easy bruising and no swollen glands.  Objective   Vitals:  /60 (BP Location: Left arm, Patient Position: Sitting, BP Cuff Size: Adult)   Pulse 66   Temp 36 °C (96.8 °F) (Temporal)   Ht 1.803 m (5' 11\")   Wt 88 kg (194 lb)   SpO2 99%   BMI 27.06 kg/m²       Physical Exam  Constitutional: Alert and in no acute distress. Well developed, well nourished.   Eyes: Pupils were equal in size, round, reactive to light (PERRL) with normal accommodation and extraocular movements intact (EOMI). Ophthalmoscopic examination: Normal.   Ears, Nose, Mouth, and Throat: External inspection of ears and nose: Normal. Otoscopic examination: Normal. Lips, teeth, and gums: Normal. Oropharynx: Normal.   Neck: No neck mass was observed. Supple. Thyroid not enlarged and there were no palpable thyroid nodules.   Cardiovascular: Heart rate and rhythm were normal, normal S1 and S2, no gallops, no murmurs and no pericardial rub. Carotid pulses: Normal with no bruits. Abdominal aorta: Normal. Pedal pulses: Normal. No peripheral edema.   Pulmonary: No respiratory distress. Clear bilateral breath sounds.   Abdomen: Soft nontender; no abdominal mass palpated. Normal bowel sounds. No organomegaly.   Skin: Normal skin color and pigmentation, normal skin turgor, and no rash.   Psychiatric: Judgment and insight: Intact. Mood and affect: Normal.  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  Stable physical exam.  2.  Blood work reviewed.  He has cardiology appointment " upcoming.  No chest pain or shortness of breath.  No lower extremity edema.  3.  Chronic renal impairment stage IV stable.  Avoid nephrotoxins.  Plenty of fluids.  Follow-up in 6 months.  4.  I spent 15 minutes obtaining and discussing depression screening using PHQ-2 questions with results documented in chart.  5.  Atrial fibrillation is stable.  Cardiomyopathy is stable.

## 2024-03-05 NOTE — ACP (ADVANCE CARE PLANNING)
Confirming Previous Code Status:   Advance Care Planning Note     Discussion Date: 03/05/24   Discussion Participants: patient    The patient wishes to discuss Advance Care Planning today and the following is a brief summary of our discussion.     Patient has capacity to make their own medical decisions: Yes  Health Care Agent/Surrogate Decision Maker documented in chart: Yes    Documents on file and valid:  Advance Directive/Living Will: Yes   Health Care Power of : Yes  Other:     Communication of Medical Status/Prognosis:        Communication of Treatment Goals/Options:        Treatment Decisions  Goals of Care: quality of life is prioritized; willing to accept low-burden treatments to achieve meaningful goals     Follow Up Plan    Team Members    Time Statement: Total face to face time spent on advance care planning was 16 minutes with 16 minutes spent in counseling, including the explanation.    Ludwin Muniz DO  3/5/2024 10:49 AM

## 2024-03-15 ENCOUNTER — LAB (OUTPATIENT)
Dept: LAB | Facility: CLINIC | Age: 89
End: 2024-03-15
Payer: MEDICARE

## 2024-03-15 DIAGNOSIS — D47.2 MGUS (MONOCLONAL GAMMOPATHY OF UNKNOWN SIGNIFICANCE): ICD-10-CM

## 2024-03-15 DIAGNOSIS — D50.9 IRON DEFICIENCY ANEMIA, UNSPECIFIED IRON DEFICIENCY ANEMIA TYPE: ICD-10-CM

## 2024-03-15 DIAGNOSIS — C61 PROSTATIC CANCER (MULTI): ICD-10-CM

## 2024-03-15 LAB
ALBUMIN SERPL BCP-MCNC: 3.9 G/DL (ref 3.4–5)
ALP SERPL-CCNC: 71 U/L (ref 33–136)
ALT SERPL W P-5'-P-CCNC: 12 U/L (ref 10–52)
ANION GAP SERPL CALC-SCNC: 14 MMOL/L (ref 10–20)
AST SERPL W P-5'-P-CCNC: 13 U/L (ref 9–39)
BASOPHILS # BLD AUTO: 0.02 X10*3/UL (ref 0–0.1)
BASOPHILS NFR BLD AUTO: 0.4 %
BILIRUB SERPL-MCNC: 0.7 MG/DL (ref 0–1.2)
BUN SERPL-MCNC: 30 MG/DL (ref 6–23)
CALCIUM SERPL-MCNC: 9.2 MG/DL (ref 8.6–10.3)
CHLORIDE SERPL-SCNC: 105 MMOL/L (ref 98–107)
CO2 SERPL-SCNC: 25 MMOL/L (ref 21–32)
CREAT SERPL-MCNC: 1.28 MG/DL (ref 0.5–1.3)
EGFRCR SERPLBLD CKD-EPI 2021: 54 ML/MIN/1.73M*2
EOSINOPHIL # BLD AUTO: 0.1 X10*3/UL (ref 0–0.4)
EOSINOPHIL NFR BLD AUTO: 1.9 %
ERYTHROCYTE [DISTWIDTH] IN BLOOD BY AUTOMATED COUNT: 12 % (ref 11.5–14.5)
FERRITIN SERPL-MCNC: 20 NG/ML (ref 20–300)
GLUCOSE SERPL-MCNC: 102 MG/DL (ref 74–99)
HCT VFR BLD AUTO: 34.2 % (ref 41–52)
HGB BLD-MCNC: 11.3 G/DL (ref 13.5–17.5)
IMM GRANULOCYTES # BLD AUTO: 0.02 X10*3/UL (ref 0–0.5)
IMM GRANULOCYTES NFR BLD AUTO: 0.4 % (ref 0–0.9)
IRON SATN MFR SERPL: 23 % (ref 25–45)
IRON SERPL-MCNC: 87 UG/DL (ref 35–150)
LYMPHOCYTES # BLD AUTO: 1.21 X10*3/UL (ref 0.8–3)
LYMPHOCYTES NFR BLD AUTO: 23.4 %
MCH RBC QN AUTO: 32.8 PG (ref 26–34)
MCHC RBC AUTO-ENTMCNC: 33 G/DL (ref 32–36)
MCV RBC AUTO: 99 FL (ref 80–100)
MONOCYTES # BLD AUTO: 0.51 X10*3/UL (ref 0.05–0.8)
MONOCYTES NFR BLD AUTO: 9.9 %
NEUTROPHILS # BLD AUTO: 3.31 X10*3/UL (ref 1.6–5.5)
NEUTROPHILS NFR BLD AUTO: 64 %
NRBC BLD-RTO: 0 /100 WBCS (ref 0–0)
PLATELET # BLD AUTO: 215 X10*3/UL (ref 150–450)
POTASSIUM SERPL-SCNC: 4.8 MMOL/L (ref 3.5–5.3)
PROT SERPL-MCNC: 6.3 G/DL (ref 6.4–8.2)
PROT SERPL-MCNC: 6.6 G/DL (ref 6.4–8.2)
PSA SERPL-MCNC: 0.12 NG/ML
RBC # BLD AUTO: 3.45 X10*6/UL (ref 4.5–5.9)
SODIUM SERPL-SCNC: 139 MMOL/L (ref 136–145)
TIBC SERPL-MCNC: 372 UG/DL (ref 240–445)
UIBC SERPL-MCNC: 285 UG/DL (ref 110–370)
WBC # BLD AUTO: 5.2 X10*3/UL (ref 4.4–11.3)

## 2024-03-15 PROCEDURE — 85025 COMPLETE CBC W/AUTO DIFF WBC: CPT | Performed by: INTERNAL MEDICINE

## 2024-03-15 PROCEDURE — 83521 IG LIGHT CHAINS FREE EACH: CPT | Mod: PARLAB | Performed by: INTERNAL MEDICINE

## 2024-03-15 PROCEDURE — 83540 ASSAY OF IRON: CPT | Performed by: INTERNAL MEDICINE

## 2024-03-15 PROCEDURE — 84153 ASSAY OF PSA TOTAL: CPT | Mod: PARLAB | Performed by: INTERNAL MEDICINE

## 2024-03-15 PROCEDURE — 82728 ASSAY OF FERRITIN: CPT | Mod: PARLAB | Performed by: INTERNAL MEDICINE

## 2024-03-15 PROCEDURE — 80053 COMPREHEN METABOLIC PANEL: CPT | Performed by: INTERNAL MEDICINE

## 2024-03-15 PROCEDURE — 84155 ASSAY OF PROTEIN SERUM: CPT | Mod: PARLAB | Performed by: INTERNAL MEDICINE

## 2024-03-15 PROCEDURE — 84165 PROTEIN E-PHORESIS SERUM: CPT | Performed by: INTERNAL MEDICINE

## 2024-03-15 PROCEDURE — 86320 SERUM IMMUNOELECTROPHORESIS: CPT | Performed by: INTERNAL MEDICINE

## 2024-03-15 PROCEDURE — 36415 COLL VENOUS BLD VENIPUNCTURE: CPT

## 2024-03-15 PROCEDURE — 86334 IMMUNOFIX E-PHORESIS SERUM: CPT | Mod: PARLAB | Performed by: INTERNAL MEDICINE

## 2024-03-17 LAB
KAPPA LC SERPL-MCNC: 3.61 MG/DL (ref 0.33–1.94)
KAPPA LC/LAMBDA SER: 1.34 {RATIO} (ref 0.26–1.65)
LAMBDA LC SERPL-MCNC: 2.7 MG/DL (ref 0.57–2.63)

## 2024-03-21 LAB
ALBUMIN: 3.5 G/DL (ref 3.4–5)
ALPHA 1 GLOBULIN: 0.3 G/DL (ref 0.2–0.6)
ALPHA 2 GLOBULIN: 0.6 G/DL (ref 0.4–1.1)
BETA GLOBULIN: 0.7 G/DL (ref 0.5–1.2)
GAMMA GLOBULIN: 1.2 G/DL (ref 0.5–1.4)
IMMUNOFIXATION COMMENT: ABNORMAL
M-PROTEIN 1: 0.4 G/DL
PATH REVIEW - SERUM IMMUNOFIXATION: ABNORMAL
PATH REVIEW-SERUM PROTEIN ELECTROPHORESIS: ABNORMAL
PROTEIN ELECTROPHORESIS COMMENT: ABNORMAL

## 2024-03-26 ENCOUNTER — OFFICE VISIT (OUTPATIENT)
Dept: HEMATOLOGY/ONCOLOGY | Facility: CLINIC | Age: 89
End: 2024-03-26
Payer: MEDICARE

## 2024-03-26 VITALS
RESPIRATION RATE: 20 BRPM | SYSTOLIC BLOOD PRESSURE: 144 MMHG | HEART RATE: 53 BPM | WEIGHT: 194 LBS | BODY MASS INDEX: 27.06 KG/M2 | DIASTOLIC BLOOD PRESSURE: 75 MMHG | OXYGEN SATURATION: 100 % | TEMPERATURE: 98.4 F

## 2024-03-26 DIAGNOSIS — D50.8 OTHER IRON DEFICIENCY ANEMIA: Primary | ICD-10-CM

## 2024-03-26 PROCEDURE — 3078F DIAST BP <80 MM HG: CPT | Performed by: INTERNAL MEDICINE

## 2024-03-26 PROCEDURE — 1036F TOBACCO NON-USER: CPT | Performed by: INTERNAL MEDICINE

## 2024-03-26 PROCEDURE — 1160F RVW MEDS BY RX/DR IN RCRD: CPT | Performed by: INTERNAL MEDICINE

## 2024-03-26 PROCEDURE — 99213 OFFICE O/P EST LOW 20 MIN: CPT | Performed by: INTERNAL MEDICINE

## 2024-03-26 PROCEDURE — 1159F MED LIST DOCD IN RCRD: CPT | Performed by: INTERNAL MEDICINE

## 2024-03-26 PROCEDURE — 3077F SYST BP >= 140 MM HG: CPT | Performed by: INTERNAL MEDICINE

## 2024-03-26 PROCEDURE — 1157F ADVNC CARE PLAN IN RCRD: CPT | Performed by: INTERNAL MEDICINE

## 2024-03-26 PROCEDURE — 1126F AMNT PAIN NOTED NONE PRSNT: CPT | Performed by: INTERNAL MEDICINE

## 2024-03-26 ASSESSMENT — PAIN SCALES - GENERAL: PAINLEVEL: 0-NO PAIN

## 2024-03-26 NOTE — PROGRESS NOTES
LOCATION:  [facility].     HEMATOLOGY & ONCOLOGY PROBLEMS:  1. Anemia      a. Iron deficiency due to poor oral iron absorption.      b. Maintained on intermittent IV iron infusions.  2. IgG Kappa monoclonal gammopathy (MGUS).     a. Currently being followed by close observation.       CHIEF COMPLAINT:    The patient is in the clinic today for management of anemia and monoclonal  gammopathy.                  HISTORY:   Mr. Amato is 88 year old pleasant gentleman with multiple medical problems, who has also been noted to have chronic anemia. He was admitted at [facility] in Dec 2017 with CHF complications  and was also noted to be profoundly anemic with iron deficiency. EGD mainly showed a hiatal hernia and a colonoscopy showed a tubular adenoma without any evidence of further bleeding lesions. He was supported with IV iron infusion with Venofer. Follow-up  blood work from April 2018 showed persistent anemia with hemoglobin of 11.3. Ferritin was low at 11 and creatinine was 1.5. Post discharge, he was maintained on oral iron but with suboptimal response. He has been supported with intermittent courses of  IV iron infusion as an outpatient with Feraheme with good response. He was also been noted to have incidental monoclonal gammopathy during evaluation for anemia. Workup was suggestive of MGUS and is being followed with close observation without any intervention.  He had repeat extensive GI evaluation in the recent past.  Colonoscopy was unremarkable.  He had capsule endoscopy which was concerning for small intestinal bleed and there was concern regarding small intestinal mass.  He eventually had a CT enterography  which was essentially unremarkable.     INTERVAL HISTORY:  Denies any specific complaints.  Feeling better and doing regular exercises.  Hemoglobin is stable.  Iron stores are inching down.     PAST MEDICAL HISTORY:   1. CHF.  2. A. fib.  3. Right bundle-branch  block.  4. Hypertension  5. Hyperlipidemia  6. Chronic kidney disease  7. GERD  8. Carotid stenosis  9. BPH s/p laser surgery.  10. History of prostate cancer status post external beam radiation treatment in 2015.     SOCIAL HISTORY:    for 60 years and lives in [place]. Nonsmoker and nonalcoholic. He is a retired  from [employer].     FAMILY HISTORY:    Father  at age 60 from myocardial infarction. Mother  at age 90 from cancer and patient not aware of the details. No other  specific history of bleeding, clotting or malignant disorders in the family.     REVIEW OF SYSTEMS:  Pertinent finding as per the history above.  All other systems have been reviewed and generally negative and noncontributory.     PHYSICAL EXAMINATION:    Detailed examination not done.      ALLERGY & MEDICATIONS:  Allergies and latest outpatient medications list were reviewed in the EMR.    LAB RESULTS:  Latest CBC from 03/15/2024 showed a hemoglobin of 11.3 with MCV of 99.  WBC and platelets were normal. Ferritin was 20 and serum iron was 87.  SPEP showed stable 0.4 g of IgG kappa monoclonal protein.  Free light chain levels were stable.  Last 3 sets of blood work were reviewed and the trend was noted.     ASSESSMENT AND PLAN:   1. Anemia. Please refer to the details of initial workup and management as outlined above. In summary he has been noted to have persistent iron  deficiency of unclear etiology. GI workup including EGD and colonoscopy was unremarkable in Dec 2017. He was supported with a course of IV iron infusion with Venofer in Dec 2017 but follow up labs from 2018 showed persistent iron deficiency. Additional  hematological evaluation revealed normal vitamin B12, folate, LDH, SPEP, haptoglobin and reticulocyte count etc. He had repeat extensive GI evaluation in the recent past.  Colonoscopy was unremarkable.  He had capsule endoscopy which was concerning for  small intestinal bleed and there  was concern regarding small intestinal mass.  He eventually had a CT enterography which was essentially unremarkable. He has been supported with intermittent courses of IV iron infusion as an outpatient with Feraheme with  good response.      Latest hemoglobin is essentially stable although iron stores are inching down.  Clinically feels okay.  For now we will continue to follow him closely and will support him with IV iron infusion intermittently as needed.  As before I again advised him to continue to follow up with GI clinic as advised by Dr. Metz.     2. MGUS. He was noted to have an incidental finding of monoclonal gammopathy during anemia workup. Overall findings are suggestive of MGUS and he is being followed with close observation without any intervention.      3. Follow up:  Patient will followup with me in 3 months.  Advised to contact us immediately if there are any new questions or problems.        This note has been transcribed using Dragon voice recognition system and there is a possibility of unintentional typing misprints.

## 2024-05-06 DIAGNOSIS — F41.9 ANXIETY: ICD-10-CM

## 2024-05-06 RX ORDER — ESCITALOPRAM OXALATE 10 MG/1
10 TABLET ORAL DAILY
Qty: 90 TABLET | Refills: 0 | Status: SHIPPED | OUTPATIENT
Start: 2024-05-06

## 2024-05-08 ENCOUNTER — OFFICE VISIT (OUTPATIENT)
Dept: CARDIOLOGY | Facility: CLINIC | Age: 89
End: 2024-05-08
Payer: MEDICARE

## 2024-05-08 VITALS
BODY MASS INDEX: 27.16 KG/M2 | SYSTOLIC BLOOD PRESSURE: 124 MMHG | HEART RATE: 59 BPM | DIASTOLIC BLOOD PRESSURE: 70 MMHG | HEIGHT: 71 IN | WEIGHT: 194 LBS | OXYGEN SATURATION: 98 %

## 2024-05-08 DIAGNOSIS — I48.0 PAROXYSMAL ATRIAL FIBRILLATION (MULTI): Primary | ICD-10-CM

## 2024-05-08 DIAGNOSIS — R00.2 PALPITATIONS: ICD-10-CM

## 2024-05-08 DIAGNOSIS — I45.10 COMPLETE RIGHT BUNDLE BRANCH BLOCK: ICD-10-CM

## 2024-05-08 DIAGNOSIS — D50.9 IRON DEFICIENCY ANEMIA, UNSPECIFIED IRON DEFICIENCY ANEMIA TYPE: ICD-10-CM

## 2024-05-08 DIAGNOSIS — I42.9 CARDIOMYOPATHY, UNSPECIFIED TYPE (MULTI): ICD-10-CM

## 2024-05-08 DIAGNOSIS — I50.32 CHRONIC DIASTOLIC CONGESTIVE HEART FAILURE (MULTI): ICD-10-CM

## 2024-05-08 DIAGNOSIS — C61 PROSTATIC CANCER (MULTI): ICD-10-CM

## 2024-05-08 PROCEDURE — 93000 ELECTROCARDIOGRAM COMPLETE: CPT | Performed by: INTERNAL MEDICINE

## 2024-05-08 PROCEDURE — 3078F DIAST BP <80 MM HG: CPT | Performed by: INTERNAL MEDICINE

## 2024-05-08 PROCEDURE — 1159F MED LIST DOCD IN RCRD: CPT | Performed by: INTERNAL MEDICINE

## 2024-05-08 PROCEDURE — 99214 OFFICE O/P EST MOD 30 MIN: CPT | Performed by: INTERNAL MEDICINE

## 2024-05-08 PROCEDURE — 3074F SYST BP LT 130 MM HG: CPT | Performed by: INTERNAL MEDICINE

## 2024-05-08 PROCEDURE — 1157F ADVNC CARE PLAN IN RCRD: CPT | Performed by: INTERNAL MEDICINE

## 2024-05-08 PROCEDURE — 1160F RVW MEDS BY RX/DR IN RCRD: CPT | Performed by: INTERNAL MEDICINE

## 2024-05-08 NOTE — PROGRESS NOTES
"  Subjective  Jase Amato  is a 88 y.o. year old male who presents for paroxsymal atrial fibrillation.  Noted irreg. Heart beat 5/7/24 andfellin good today.  No further palpiaiotns, no dyspnea, no chest pain, no edema.  To see Dr. Walker upcoming.  Spending a lot of time in the sun    Blood pressure 124/70, pulse 59, height 1.803 m (5' 11\"), weight 88 kg (194 lb), SpO2 98%.   Patient has no known allergies.  Past Medical History:   Diagnosis Date    Chronic systolic (congestive) heart failure (Multi)     Chronic systolic congestive heart failure    Dilated cardiomyopathy (Multi)     Secondary dilated cardiomyopathy    Dizziness and giddiness     Light headedness    Encounter for follow-up examination after completed treatment for conditions other than malignant neoplasm 08/09/2022    Hospital discharge follow-up    Encounter for preprocedural laboratory examination     Pre-procedure lab exam    Essential (primary) hypertension     Benign essential HTN    Gastrointestinal hemorrhage, unspecified 11/04/2021    Chronic upper gastrointestinal bleeding    Generalized anxiety disorder 08/30/2022    Anxiety, generalized    Impacted cerumen, left ear 08/30/2022    Hearing loss due to cerumen impaction, left    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    Iron deficiency anemia due to chronic blood loss    Personal history of diseases of the blood and blood-forming organs and certain disorders involving the immune mechanism 09/02/2021    History of anemia    Personal history of diseases of the blood and blood-forming organs and certain disorders involving the immune mechanism 05/09/2018    History of iron deficiency anemia    Personal history of malignant neoplasm of prostate     History of malignant neoplasm of prostate    Personal history of other benign neoplasm 11/04/2021    History of other benign neoplasm    Personal history of other diseases of the circulatory system 08/11/2022    " History of carotid artery stenosis    Personal history of other diseases of the circulatory system     History of cardiomyopathy    Personal history of other diseases of the circulatory system     Atrial fibrillation, currently in sinus rhythm    Personal history of other diseases of the circulatory system     History of atrial fibrillation    Personal history of other diseases of the nervous system and sense organs 08/30/2022    History of acute otitis externa    Personal history of other endocrine, nutritional and metabolic disease     History of hyperlipidemia    Personal history of other specified conditions 05/18/2022    History of palpitations    Personal history of other specified conditions     History of urinary retention    Unspecified fall, subsequent encounter 01/06/2022    Accidental fall, subsequent encounter     Past Surgical History:   Procedure Laterality Date    MR HEAD ANGIO WO IV CONTRAST  7/28/2022    MR HEAD ANGIO WO IV CONTRAST 7/28/2022 PAR [facility]    MR NECK ANGIO WO IV CONTRAST  7/28/2022    MR NECK ANGIO WO IV CONTRAST 7/28/2022 PAR [facility]Y [facility]    PROSTATE SURGERY  01/25/2018    Prostate Surgery     No family history on file.  @[facility]    Current Outpatient Medications   Medication Sig Dispense Refill    atorvastatin (Lipitor) 40 mg tablet TAKE 1 TABLET BY MOUTH DAILY 90 tablet 3    escitalopram (Lexapro) 10 mg tablet Take 1 tablet (10 mg) by mouth once daily. 90 tablet 0    furosemide (Lasix) 40 mg tablet Take by mouth.      lisinopril 2.5 mg tablet Take by mouth.      metoprolol succinate XL (Toprol-XL) 50 mg 24 hr tablet TAKE 1 TABLET BY MOUTH DAILY 90 tablet 3    Xarelto 20 mg tablet TAKE 1 TABLET BY MOUTH DAILY 90 tablet 3     No current facility-administered medications for this visit.        ROS  Review of Systems   All other systems reviewed and are negative.      Physical Exam  Physical Exam  Constitutional:       Appearance: Normal appearance.   HENT:      Head:  Normocephalic and atraumatic.   Cardiovascular:      Rate and Rhythm: Normal rate and regular rhythm.      Comments: S2 widely split  Musculoskeletal:      Right lower leg: No edema.      Left lower leg: No edema.   Skin:     General: Skin is warm and dry.   Neurological:      General: No focal deficit present.      Mental Status: He is alert and oriented to person, place, and time.   Psychiatric:         Mood and Affect: Mood normal.         Behavior: Behavior normal.          EKG  Encounter Date: 02/08/24   ECG 12 Lead    Narrative    Sinus bradycardia at 50/min., RBBB       Problem List Items Addressed This Visit       Anemia, iron deficiency    Chronic diastolic congestive heart failure (Multi)     7/29/22 echocadiogram LVEF = 65-70% with midl to moderate aortic insufficiency         Relevant Orders    Follow Up In Cardiology    Complete right bundle branch block    Paroxysmal atrial fibrillation (Multi) - Primary    Relevant Orders    ECG 12 Lead    Follow Up In Cardiology    Prostatic cancer (Multi)    Cardiomyopathy (Multi)     Tachycafdia related 4/20/15 resolved         Palpitations         Same meds with recheck EKG 3 months follow up      Mike Gutiérrez MD

## 2024-05-16 ENCOUNTER — APPOINTMENT (OUTPATIENT)
Dept: HEMATOLOGY/ONCOLOGY | Facility: CLINIC | Age: 89
End: 2024-05-16
Payer: MEDICARE

## 2024-05-23 DIAGNOSIS — I50.32 CHRONIC DIASTOLIC CONGESTIVE HEART FAILURE (MULTI): ICD-10-CM

## 2024-05-24 RX ORDER — FUROSEMIDE 40 MG/1
40 TABLET ORAL 3 TIMES WEEKLY
Qty: 39 TABLET | Refills: 3 | Status: SHIPPED | OUTPATIENT
Start: 2024-05-24

## 2024-06-17 ENCOUNTER — LAB (OUTPATIENT)
Dept: LAB | Facility: CLINIC | Age: 89
End: 2024-06-17
Payer: MEDICARE

## 2024-06-17 DIAGNOSIS — D50.8 OTHER IRON DEFICIENCY ANEMIA: ICD-10-CM

## 2024-06-17 LAB
ALBUMIN SERPL BCP-MCNC: 3.6 G/DL (ref 3.4–5)
ALP SERPL-CCNC: 81 U/L (ref 33–136)
ALT SERPL W P-5'-P-CCNC: 17 U/L (ref 10–52)
ANION GAP SERPL CALC-SCNC: 11 MMOL/L (ref 10–20)
AST SERPL W P-5'-P-CCNC: 16 U/L (ref 9–39)
BASOPHILS # BLD AUTO: 0.02 X10*3/UL (ref 0–0.1)
BASOPHILS NFR BLD AUTO: 0.4 %
BILIRUB SERPL-MCNC: 0.5 MG/DL (ref 0–1.2)
BUN SERPL-MCNC: 24 MG/DL (ref 6–23)
CALCIUM SERPL-MCNC: 8.4 MG/DL (ref 8.6–10.3)
CHLORIDE SERPL-SCNC: 105 MMOL/L (ref 98–107)
CO2 SERPL-SCNC: 25 MMOL/L (ref 21–32)
CREAT SERPL-MCNC: 1.2 MG/DL (ref 0.5–1.3)
EGFRCR SERPLBLD CKD-EPI 2021: 59 ML/MIN/1.73M*2
EOSINOPHIL # BLD AUTO: 0.09 X10*3/UL (ref 0–0.4)
EOSINOPHIL NFR BLD AUTO: 1.6 %
ERYTHROCYTE [DISTWIDTH] IN BLOOD BY AUTOMATED COUNT: 13.4 % (ref 11.5–14.5)
FERRITIN SERPL-MCNC: 19 NG/ML (ref 20–300)
GLUCOSE SERPL-MCNC: 113 MG/DL (ref 74–99)
HCT VFR BLD AUTO: 33.2 % (ref 41–52)
HGB BLD-MCNC: 10.7 G/DL (ref 13.5–17.5)
IMM GRANULOCYTES # BLD AUTO: 0.04 X10*3/UL (ref 0–0.5)
IMM GRANULOCYTES NFR BLD AUTO: 0.7 % (ref 0–0.9)
IRON SATN MFR SERPL: 16 % (ref 25–45)
IRON SERPL-MCNC: 56 UG/DL (ref 35–150)
LYMPHOCYTES # BLD AUTO: 1.48 X10*3/UL (ref 0.8–3)
LYMPHOCYTES NFR BLD AUTO: 26.8 %
MCH RBC QN AUTO: 30 PG (ref 26–34)
MCHC RBC AUTO-ENTMCNC: 32.2 G/DL (ref 32–36)
MCV RBC AUTO: 93 FL (ref 80–100)
MONOCYTES # BLD AUTO: 0.69 X10*3/UL (ref 0.05–0.8)
MONOCYTES NFR BLD AUTO: 12.5 %
NEUTROPHILS # BLD AUTO: 3.21 X10*3/UL (ref 1.6–5.5)
NEUTROPHILS NFR BLD AUTO: 58 %
NRBC BLD-RTO: 0 /100 WBCS (ref 0–0)
PLATELET # BLD AUTO: 221 X10*3/UL (ref 150–450)
POTASSIUM SERPL-SCNC: 4.6 MMOL/L (ref 3.5–5.3)
PROT SERPL-MCNC: 6.1 G/DL (ref 6.4–8.2)
RBC # BLD AUTO: 3.57 X10*6/UL (ref 4.5–5.9)
SODIUM SERPL-SCNC: 136 MMOL/L (ref 136–145)
TIBC SERPL-MCNC: 347 UG/DL (ref 240–445)
UIBC SERPL-MCNC: 291 UG/DL (ref 110–370)
WBC # BLD AUTO: 5.5 X10*3/UL (ref 4.4–11.3)

## 2024-06-17 PROCEDURE — 36415 COLL VENOUS BLD VENIPUNCTURE: CPT

## 2024-06-17 PROCEDURE — 80053 COMPREHEN METABOLIC PANEL: CPT | Performed by: INTERNAL MEDICINE

## 2024-06-17 PROCEDURE — 85025 COMPLETE CBC W/AUTO DIFF WBC: CPT | Performed by: INTERNAL MEDICINE

## 2024-06-17 PROCEDURE — 82728 ASSAY OF FERRITIN: CPT | Mod: PARLAB | Performed by: INTERNAL MEDICINE

## 2024-06-17 PROCEDURE — 83540 ASSAY OF IRON: CPT | Performed by: INTERNAL MEDICINE

## 2024-07-02 ENCOUNTER — OFFICE VISIT (OUTPATIENT)
Dept: HEMATOLOGY/ONCOLOGY | Facility: CLINIC | Age: 89
End: 2024-07-02
Payer: MEDICARE

## 2024-07-02 VITALS
WEIGHT: 187.39 LBS | DIASTOLIC BLOOD PRESSURE: 74 MMHG | SYSTOLIC BLOOD PRESSURE: 148 MMHG | HEIGHT: 70 IN | BODY MASS INDEX: 26.83 KG/M2 | OXYGEN SATURATION: 97 % | RESPIRATION RATE: 20 BRPM | HEART RATE: 52 BPM | TEMPERATURE: 97.2 F

## 2024-07-02 DIAGNOSIS — D50.8 OTHER IRON DEFICIENCY ANEMIA: ICD-10-CM

## 2024-07-02 PROCEDURE — 99214 OFFICE O/P EST MOD 30 MIN: CPT | Performed by: INTERNAL MEDICINE

## 2024-07-02 PROCEDURE — 1126F AMNT PAIN NOTED NONE PRSNT: CPT | Performed by: INTERNAL MEDICINE

## 2024-07-02 PROCEDURE — 3078F DIAST BP <80 MM HG: CPT | Performed by: INTERNAL MEDICINE

## 2024-07-02 PROCEDURE — 1159F MED LIST DOCD IN RCRD: CPT | Performed by: INTERNAL MEDICINE

## 2024-07-02 PROCEDURE — 1157F ADVNC CARE PLAN IN RCRD: CPT | Performed by: INTERNAL MEDICINE

## 2024-07-02 PROCEDURE — 3077F SYST BP >= 140 MM HG: CPT | Performed by: INTERNAL MEDICINE

## 2024-07-02 RX ORDER — FAMOTIDINE 10 MG/ML
20 INJECTION INTRAVENOUS ONCE AS NEEDED
OUTPATIENT
Start: 2024-07-09

## 2024-07-02 RX ORDER — DIPHENHYDRAMINE HYDROCHLORIDE 50 MG/ML
50 INJECTION INTRAMUSCULAR; INTRAVENOUS AS NEEDED
OUTPATIENT
Start: 2024-07-09

## 2024-07-02 RX ORDER — ALBUTEROL SULFATE 0.83 MG/ML
3 SOLUTION RESPIRATORY (INHALATION) AS NEEDED
OUTPATIENT
Start: 2024-07-09

## 2024-07-02 RX ORDER — EPINEPHRINE 0.3 MG/.3ML
0.3 INJECTION SUBCUTANEOUS EVERY 5 MIN PRN
OUTPATIENT
Start: 2024-07-09

## 2024-07-02 ASSESSMENT — PAIN SCALES - GENERAL: PAINLEVEL: 0-NO PAIN

## 2024-07-02 NOTE — PROGRESS NOTES
LOCATION:  [facility].     HEMATOLOGY & ONCOLOGY PROBLEMS:  1. Anemia      a. Iron deficiency due to poor oral iron absorption.      b. Maintained on intermittent IV iron infusions.  2. IgG Kappa monoclonal gammopathy (MGUS).     a. Currently being followed by close observation.       CHIEF COMPLAINT:    The patient is in the clinic today for management of anemia and monoclonal  gammopathy.                  HISTORY:   Mr. Amato is 88 year old pleasant gentleman with multiple medical problems, who has also been noted to have chronic anemia. He was admitted at [facility] in Dec 2017 with CHF complications  and was also noted to be profoundly anemic with iron deficiency. EGD mainly showed a hiatal hernia and a colonoscopy showed a tubular adenoma without any evidence of further bleeding lesions. He was supported with IV iron infusion with Venofer. Follow-up  blood work from April 2018 showed persistent anemia with hemoglobin of 11.3. Ferritin was low at 11 and creatinine was 1.5. Post discharge, he was maintained on oral iron but with suboptimal response. He has been supported with intermittent courses of  IV iron infusion as an outpatient with Feraheme with good response. He was also been noted to have incidental monoclonal gammopathy during evaluation for anemia. Workup was suggestive of MGUS and is being followed with close observation without any intervention.  He had repeat extensive GI evaluation in the recent past.  Colonoscopy was unremarkable.  He had capsule endoscopy which was concerning for small intestinal bleed and there was concern regarding small intestinal mass.  He eventually had a CT enterography  which was essentially unremarkable.     INTERVAL HISTORY:  Denies any specific complaints.  Overall feeling okay but has been noted gradually worsening weakness and fatigue again.  Hemoglobin is stable.  Iron stores have dropped again.     PAST MEDICAL HISTORY:   1.  CHF.  2. A. fib.  3. Right bundle-branch block.  4. Hypertension  5. Hyperlipidemia  6. Chronic kidney disease  7. GERD  8. Carotid stenosis  9. BPH s/p laser surgery.  10. History of prostate cancer status post external beam radiation treatment in .     SOCIAL HISTORY:    for 60 years and lives in [place]. Nonsmoker and nonalcoholic. He is a retired  from [employer].     FAMILY HISTORY:    Father  at age 60 from myocardial infarction. Mother  at age 90 from cancer and patient not aware of the details. No other  specific history of bleeding, clotting or malignant disorders in the family.     REVIEW OF SYSTEMS:  Pertinent finding as per the history above.  All other systems have been reviewed and generally negative and noncontributory.     PHYSICAL EXAMINATION:    Detailed examination not done.      ALLERGY & MEDICATIONS:  Allergies and latest outpatient medications list were reviewed in the EMR.    LAB RESULTS:  Latest CBC from 2024 showed a white cell count of 5.5 and hemoglobin of 10.7 and platelets of 221.  Metabolic profile was unremarkable other than glucose of 113.  Iron percentage saturation is 16 with ferritin of 19.  Last SPEP from 3/15/2024 showed stable 0.4 g of IgG kappa monoclonal protein.  Free light chain levels were stable.  Last 3 sets of blood work were reviewed and the trend was noted.     ASSESSMENT AND PLAN:   1. Anemia. Please refer to the details of initial workup and management as outlined above. In summary he has been noted to have persistent iron  deficiency of unclear etiology. GI workup including EGD and colonoscopy was unremarkable in Dec 2017. He was supported with a course of IV iron infusion with Venofer in Dec 2017 but follow up labs from 2018 showed persistent iron deficiency. Additional  hematological evaluation revealed normal vitamin B12, folate, LDH, SPEP, haptoglobin and reticulocyte count etc. He had repeat extensive GI  evaluation in the recent past.  Colonoscopy was unremarkable.  He had capsule endoscopy which was concerning for  small intestinal bleed and there was concern regarding small intestinal mass.  He eventually had a CT enterography which was essentially unremarkable. He has been supported with intermittent courses of IV iron infusion as an outpatient with Feraheme with  good response.      Latest hemoglobin and iron stores are inching down.  Clinically he is starting to feel slightly tired again.  I will support him with another course of Feraheme infusion in the next few weeks.  As before I again advised him to continue to follow up with GI clinic as advised by Dr. Metz.    2. MGUS. He was noted to have an incidental finding of monoclonal gammopathy during anemia workup. Overall findings are suggestive of MGUS and he is being followed with close observation without any intervention.      3. Follow up:  Patient will followup with me in 3 months.  In the meantime he will be scheduled for Feraheme infusion as detailed above.  Advised to contact us immediately if there are any new questions or problems.        This note has been transcribed using Dragon voice recognition system and there is a possibility of unintentional typing misprints.

## 2024-07-11 ENCOUNTER — TELEPHONE (OUTPATIENT)
Dept: HEMATOLOGY/ONCOLOGY | Facility: CLINIC | Age: 89
End: 2024-07-11
Payer: MEDICARE

## 2024-07-11 NOTE — TELEPHONE ENCOUNTER
Message left for patient that his feraheme infusions are approved by insurance and to call me back to get scheduled. Awaiting return phone call.

## 2024-07-16 DIAGNOSIS — I10 ESSENTIAL HYPERTENSION: Primary | ICD-10-CM

## 2024-07-17 ENCOUNTER — INFUSION (OUTPATIENT)
Dept: HEMATOLOGY/ONCOLOGY | Facility: CLINIC | Age: 89
End: 2024-07-17
Payer: MEDICARE

## 2024-07-17 VITALS
SYSTOLIC BLOOD PRESSURE: 151 MMHG | RESPIRATION RATE: 18 BRPM | HEART RATE: 60 BPM | TEMPERATURE: 96.8 F | OXYGEN SATURATION: 96 % | DIASTOLIC BLOOD PRESSURE: 71 MMHG

## 2024-07-17 DIAGNOSIS — D50.9 IRON DEFICIENCY ANEMIA, UNSPECIFIED IRON DEFICIENCY ANEMIA TYPE: ICD-10-CM

## 2024-07-17 PROCEDURE — 2500000004 HC RX 250 GENERAL PHARMACY W/ HCPCS (ALT 636 FOR OP/ED): Mod: JZ | Performed by: INTERNAL MEDICINE

## 2024-07-17 PROCEDURE — 96365 THER/PROPH/DIAG IV INF INIT: CPT | Mod: INF

## 2024-07-17 RX ORDER — DIPHENHYDRAMINE HYDROCHLORIDE 50 MG/ML
50 INJECTION INTRAMUSCULAR; INTRAVENOUS AS NEEDED
Status: DISCONTINUED | OUTPATIENT
Start: 2024-07-17 | End: 2024-07-17 | Stop reason: HOSPADM

## 2024-07-17 RX ORDER — LISINOPRIL 2.5 MG/1
2.5 TABLET ORAL DAILY
Qty: 90 TABLET | Refills: 3 | Status: SHIPPED | OUTPATIENT
Start: 2024-07-17

## 2024-07-17 RX ORDER — ALBUTEROL SULFATE 0.83 MG/ML
3 SOLUTION RESPIRATORY (INHALATION) AS NEEDED
Status: DISCONTINUED | OUTPATIENT
Start: 2024-07-17 | End: 2024-07-17 | Stop reason: HOSPADM

## 2024-07-17 RX ORDER — FAMOTIDINE 10 MG/ML
20 INJECTION INTRAVENOUS ONCE AS NEEDED
Status: DISCONTINUED | OUTPATIENT
Start: 2024-07-17 | End: 2024-07-17 | Stop reason: HOSPADM

## 2024-07-17 RX ORDER — DIPHENHYDRAMINE HYDROCHLORIDE 50 MG/ML
50 INJECTION INTRAMUSCULAR; INTRAVENOUS AS NEEDED
OUTPATIENT
Start: 2024-07-24

## 2024-07-17 RX ORDER — ALBUTEROL SULFATE 0.83 MG/ML
3 SOLUTION RESPIRATORY (INHALATION) AS NEEDED
OUTPATIENT
Start: 2024-07-24

## 2024-07-17 RX ORDER — EPINEPHRINE 0.3 MG/.3ML
0.3 INJECTION SUBCUTANEOUS EVERY 5 MIN PRN
Status: DISCONTINUED | OUTPATIENT
Start: 2024-07-17 | End: 2024-07-17 | Stop reason: HOSPADM

## 2024-07-17 RX ORDER — FAMOTIDINE 10 MG/ML
20 INJECTION INTRAVENOUS ONCE AS NEEDED
OUTPATIENT
Start: 2024-07-24

## 2024-07-17 RX ORDER — EPINEPHRINE 0.3 MG/.3ML
0.3 INJECTION SUBCUTANEOUS EVERY 5 MIN PRN
OUTPATIENT
Start: 2024-07-24

## 2024-07-17 ASSESSMENT — PAIN SCALES - GENERAL: PAINLEVEL: 0-NO PAIN

## 2024-07-24 ENCOUNTER — INFUSION (OUTPATIENT)
Dept: HEMATOLOGY/ONCOLOGY | Facility: CLINIC | Age: 89
End: 2024-07-24
Payer: MEDICARE

## 2024-07-24 VITALS
SYSTOLIC BLOOD PRESSURE: 139 MMHG | RESPIRATION RATE: 18 BRPM | DIASTOLIC BLOOD PRESSURE: 64 MMHG | TEMPERATURE: 97.3 F | OXYGEN SATURATION: 95 % | HEART RATE: 57 BPM

## 2024-07-24 DIAGNOSIS — D50.9 IRON DEFICIENCY ANEMIA, UNSPECIFIED IRON DEFICIENCY ANEMIA TYPE: ICD-10-CM

## 2024-07-24 PROCEDURE — 96365 THER/PROPH/DIAG IV INF INIT: CPT | Mod: INF

## 2024-07-24 PROCEDURE — 2500000004 HC RX 250 GENERAL PHARMACY W/ HCPCS (ALT 636 FOR OP/ED): Mod: JZ | Performed by: INTERNAL MEDICINE

## 2024-07-24 RX ORDER — DIPHENHYDRAMINE HYDROCHLORIDE 50 MG/ML
50 INJECTION INTRAMUSCULAR; INTRAVENOUS AS NEEDED
Status: DISCONTINUED | OUTPATIENT
Start: 2024-07-24 | End: 2024-07-24 | Stop reason: HOSPADM

## 2024-07-24 RX ORDER — ALBUTEROL SULFATE 0.83 MG/ML
3 SOLUTION RESPIRATORY (INHALATION) AS NEEDED
OUTPATIENT
Start: 2024-07-24

## 2024-07-24 RX ORDER — ALBUTEROL SULFATE 0.83 MG/ML
3 SOLUTION RESPIRATORY (INHALATION) AS NEEDED
Status: DISCONTINUED | OUTPATIENT
Start: 2024-07-24 | End: 2024-07-24 | Stop reason: HOSPADM

## 2024-07-24 RX ORDER — FAMOTIDINE 10 MG/ML
20 INJECTION INTRAVENOUS ONCE AS NEEDED
OUTPATIENT
Start: 2024-07-24

## 2024-07-24 RX ORDER — EPINEPHRINE 0.3 MG/.3ML
0.3 INJECTION SUBCUTANEOUS EVERY 5 MIN PRN
Status: DISCONTINUED | OUTPATIENT
Start: 2024-07-24 | End: 2024-07-24 | Stop reason: HOSPADM

## 2024-07-24 RX ORDER — EPINEPHRINE 0.3 MG/.3ML
0.3 INJECTION SUBCUTANEOUS EVERY 5 MIN PRN
OUTPATIENT
Start: 2024-07-24

## 2024-07-24 RX ORDER — DIPHENHYDRAMINE HYDROCHLORIDE 50 MG/ML
50 INJECTION INTRAMUSCULAR; INTRAVENOUS AS NEEDED
OUTPATIENT
Start: 2024-07-24

## 2024-07-24 RX ORDER — FAMOTIDINE 10 MG/ML
20 INJECTION INTRAVENOUS ONCE AS NEEDED
Status: DISCONTINUED | OUTPATIENT
Start: 2024-07-24 | End: 2024-07-24 | Stop reason: HOSPADM

## 2024-07-24 ASSESSMENT — PAIN SCALES - GENERAL: PAINLEVEL: 0-NO PAIN

## 2024-08-02 DIAGNOSIS — F41.9 ANXIETY: ICD-10-CM

## 2024-08-02 RX ORDER — ESCITALOPRAM OXALATE 10 MG/1
10 TABLET ORAL DAILY
Qty: 90 TABLET | Refills: 0 | Status: SHIPPED | OUTPATIENT
Start: 2024-08-02

## 2024-08-05 DIAGNOSIS — F41.9 ANXIETY: ICD-10-CM

## 2024-08-05 RX ORDER — ESCITALOPRAM OXALATE 10 MG/1
10 TABLET ORAL DAILY
Qty: 90 TABLET | Refills: 0 | Status: SHIPPED | OUTPATIENT
Start: 2024-08-05

## 2024-08-08 ENCOUNTER — APPOINTMENT (OUTPATIENT)
Dept: CARDIOLOGY | Facility: CLINIC | Age: 89
End: 2024-08-08
Payer: MEDICARE

## 2024-08-27 ENCOUNTER — APPOINTMENT (OUTPATIENT)
Dept: CARDIOLOGY | Facility: CLINIC | Age: 89
End: 2024-08-27
Payer: MEDICARE

## 2024-08-27 VITALS
BODY MASS INDEX: 26.15 KG/M2 | HEIGHT: 71 IN | WEIGHT: 186.8 LBS | DIASTOLIC BLOOD PRESSURE: 72 MMHG | SYSTOLIC BLOOD PRESSURE: 130 MMHG | OXYGEN SATURATION: 98 % | HEART RATE: 60 BPM

## 2024-08-27 DIAGNOSIS — N18.4 STAGE 4 CHRONIC KIDNEY DISEASE (MULTI): ICD-10-CM

## 2024-08-27 DIAGNOSIS — I45.10 COMPLETE RIGHT BUNDLE BRANCH BLOCK: ICD-10-CM

## 2024-08-27 DIAGNOSIS — I50.32 CHRONIC DIASTOLIC CONGESTIVE HEART FAILURE (MULTI): ICD-10-CM

## 2024-08-27 DIAGNOSIS — I42.9 CARDIOMYOPATHY, UNSPECIFIED TYPE (MULTI): ICD-10-CM

## 2024-08-27 DIAGNOSIS — C61 PROSTATIC CANCER (MULTI): ICD-10-CM

## 2024-08-27 DIAGNOSIS — I48.0 PAROXYSMAL ATRIAL FIBRILLATION (MULTI): Primary | ICD-10-CM

## 2024-08-27 PROCEDURE — 93005 ELECTROCARDIOGRAM TRACING: CPT | Performed by: INTERNAL MEDICINE

## 2024-08-27 PROCEDURE — 1036F TOBACCO NON-USER: CPT | Performed by: INTERNAL MEDICINE

## 2024-08-27 PROCEDURE — 1157F ADVNC CARE PLAN IN RCRD: CPT | Performed by: INTERNAL MEDICINE

## 2024-08-27 PROCEDURE — 3074F SYST BP LT 130 MM HG: CPT | Performed by: INTERNAL MEDICINE

## 2024-08-27 PROCEDURE — 99214 OFFICE O/P EST MOD 30 MIN: CPT | Performed by: INTERNAL MEDICINE

## 2024-08-27 PROCEDURE — 1160F RVW MEDS BY RX/DR IN RCRD: CPT | Performed by: INTERNAL MEDICINE

## 2024-08-27 PROCEDURE — 93010 ELECTROCARDIOGRAM REPORT: CPT | Performed by: INTERNAL MEDICINE

## 2024-08-27 PROCEDURE — 1159F MED LIST DOCD IN RCRD: CPT | Performed by: INTERNAL MEDICINE

## 2024-08-27 PROCEDURE — 3078F DIAST BP <80 MM HG: CPT | Performed by: INTERNAL MEDICINE

## 2024-08-27 NOTE — PROGRESS NOTES
"  Subjective  Jase Amato  is a 89 y.o. year old male who presents for paroxysmal atrial fibrillation.  No chest pain, no dyspnea, no palpitations, no edema.  He stopped sleeping pill and is feeling much better    Blood pressure 130/72, pulse 60, height 1.803 m (5' 11\"), weight 84.7 kg (186 lb 12.8 oz), SpO2 98%.   Patient has no known allergies.  Past Medical History:   Diagnosis Date    Chronic systolic (congestive) heart failure (Multi)     Chronic systolic congestive heart failure    Dilated cardiomyopathy (Multi)     Secondary dilated cardiomyopathy    Dizziness and giddiness     Light headedness    Encounter for follow-up examination after completed treatment for conditions other than malignant neoplasm 08/09/2022    Hospital discharge follow-up    Encounter for preprocedural laboratory examination     Pre-procedure lab exam    Essential (primary) hypertension     Benign essential HTN    Gastrointestinal hemorrhage, unspecified 11/04/2021    Chronic upper gastrointestinal bleeding    Generalized anxiety disorder 08/30/2022    Anxiety, generalized    Impacted cerumen, left ear 08/30/2022    Hearing loss due to cerumen impaction, left    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    Iron deficiency anemia due to chronic blood loss    Personal history of diseases of the blood and blood-forming organs and certain disorders involving the immune mechanism 09/02/2021    History of anemia    Personal history of diseases of the blood and blood-forming organs and certain disorders involving the immune mechanism 05/09/2018    History of iron deficiency anemia    Personal history of malignant neoplasm of prostate     History of malignant neoplasm of prostate    Personal history of other benign neoplasm 11/04/2021    History of other benign neoplasm    Personal history of other diseases of the circulatory system 08/11/2022    History of carotid artery stenosis    Personal history of other " diseases of the circulatory system     History of cardiomyopathy    Personal history of other diseases of the circulatory system     Atrial fibrillation, currently in sinus rhythm    Personal history of other diseases of the circulatory system     History of atrial fibrillation    Personal history of other diseases of the nervous system and sense organs 08/30/2022    History of acute otitis externa    Personal history of other endocrine, nutritional and metabolic disease     History of hyperlipidemia    Personal history of other specified conditions 05/18/2022    History of palpitations    Personal history of other specified conditions     History of urinary retention    Unspecified fall, subsequent encounter 01/06/2022    Accidental fall, subsequent encounter     Past Surgical History:   Procedure Laterality Date    MR HEAD ANGIO WO IV CONTRAST  7/28/2022    MR HEAD ANGIO WO IV CONTRAST 7/28/2022 PAR [facility]Y [facility]    MR NECK ANGIO WO IV CONTRAST  7/28/2022    MR NECK ANGIO WO IV CONTRAST 7/28/2022 PAR [facility]Y [facility]    PROSTATE SURGERY  01/25/2018    Prostate Surgery     No family history on file.  @[facility]    Current Outpatient Medications   Medication Sig Dispense Refill    atorvastatin (Lipitor) 40 mg tablet TAKE 1 TABLET BY MOUTH DAILY 90 tablet 3    escitalopram (Lexapro) 10 mg tablet Take 1 tablet (10 mg) by mouth once daily. 90 tablet 0    furosemide (Lasix) 40 mg tablet TAKE 1 TABLET BY MOUTH 3 TIMES  WEEKLY 39 tablet 3    lisinopril 2.5 mg tablet TAKE 1 TABLET BY MOUTH DAILY 90 tablet 3    metoprolol succinate XL (Toprol-XL) 50 mg 24 hr tablet TAKE 1 TABLET BY MOUTH DAILY 90 tablet 3    Xarelto 20 mg tablet TAKE 1 TABLET BY MOUTH DAILY 90 tablet 3     No current facility-administered medications for this visit.        ROS  Review of Systems   All other systems reviewed and are negative.      Physical Exam  Physical Exam  Constitutional:       Appearance: Normal appearance.   HENT:      Head: Normocephalic  and atraumatic.   Cardiovascular:      Rate and Rhythm: Normal rate and regular rhythm.      Comments: S2 widely split  Pulmonary:      Effort: Pulmonary effort is normal.      Breath sounds: Normal breath sounds.   Abdominal:      General: Abdomen is flat.      Palpations: Abdomen is soft.   Skin:     General: Skin is warm and dry.   Neurological:      General: No focal deficit present.      Mental Status: He is alert and oriented to person, place, and time.   Psychiatric:         Mood and Affect: Mood normal.         Behavior: Behavior normal.          EKG  Encounter Date: 08/27/24   ECG 12 Lead    Narrative    Sinus bradycardia at 52/min.,  RBBB       Problem List Items Addressed This Visit       Chronic diastolic congestive heart failure (Multi)     7/29/22 echocardiogram LVEF = 65-70% with mild to moderate aortic insufficiency         Complete right bundle branch block    Paroxysmal atrial fibrillation (Multi) - Primary    Relevant Orders    ECG 12 Lead (Completed)    Prostatic cancer (Multi)    Stage 4 chronic kidney disease (Multi)    Cardiomyopathy (Multi)     Tachycardia related 4/20/15 resolved              Same meds with return 3 months with EKG      Mike Gutiérrez MD

## 2024-08-28 ENCOUNTER — LAB (OUTPATIENT)
Dept: LAB | Facility: CLINIC | Age: 89
End: 2024-08-28
Payer: MEDICARE

## 2024-08-28 DIAGNOSIS — D50.9 IRON DEFICIENCY ANEMIA, UNSPECIFIED IRON DEFICIENCY ANEMIA TYPE: ICD-10-CM

## 2024-08-28 LAB
ALBUMIN SERPL BCP-MCNC: 3.8 G/DL (ref 3.4–5)
ALP SERPL-CCNC: 75 U/L (ref 33–136)
ALT SERPL W P-5'-P-CCNC: 12 U/L (ref 10–52)
ANION GAP SERPL CALC-SCNC: 9 MMOL/L (ref 10–20)
AST SERPL W P-5'-P-CCNC: 13 U/L (ref 9–39)
BASOPHILS # BLD AUTO: 0.02 X10*3/UL (ref 0–0.1)
BASOPHILS NFR BLD AUTO: 0.4 %
BILIRUB SERPL-MCNC: 0.6 MG/DL (ref 0–1.2)
BUN SERPL-MCNC: 20 MG/DL (ref 6–23)
CALCIUM SERPL-MCNC: 8.6 MG/DL (ref 8.6–10.3)
CHLORIDE SERPL-SCNC: 105 MMOL/L (ref 98–107)
CO2 SERPL-SCNC: 28 MMOL/L (ref 21–32)
CREAT SERPL-MCNC: 1.13 MG/DL (ref 0.5–1.3)
EGFRCR SERPLBLD CKD-EPI 2021: 63 ML/MIN/1.73M*2
EOSINOPHIL # BLD AUTO: 0.06 X10*3/UL (ref 0–0.4)
EOSINOPHIL NFR BLD AUTO: 1.2 %
ERYTHROCYTE [DISTWIDTH] IN BLOOD BY AUTOMATED COUNT: 14.9 % (ref 11.5–14.5)
FERRITIN SERPL-MCNC: 231 NG/ML (ref 20–300)
GLUCOSE SERPL-MCNC: 103 MG/DL (ref 74–99)
HCT VFR BLD AUTO: 35.4 % (ref 41–52)
HGB BLD-MCNC: 11.7 G/DL (ref 13.5–17.5)
IMM GRANULOCYTES # BLD AUTO: 0.03 X10*3/UL (ref 0–0.5)
IMM GRANULOCYTES NFR BLD AUTO: 0.6 % (ref 0–0.9)
IRON SATN MFR SERPL: 43 % (ref 25–45)
IRON SERPL-MCNC: 103 UG/DL (ref 35–150)
LYMPHOCYTES # BLD AUTO: 1.24 X10*3/UL (ref 0.8–3)
LYMPHOCYTES NFR BLD AUTO: 25.2 %
MCH RBC QN AUTO: 32.2 PG (ref 26–34)
MCHC RBC AUTO-ENTMCNC: 33.1 G/DL (ref 32–36)
MCV RBC AUTO: 98 FL (ref 80–100)
MONOCYTES # BLD AUTO: 0.43 X10*3/UL (ref 0.05–0.8)
MONOCYTES NFR BLD AUTO: 8.7 %
NEUTROPHILS # BLD AUTO: 3.14 X10*3/UL (ref 1.6–5.5)
NEUTROPHILS NFR BLD AUTO: 63.9 %
NRBC BLD-RTO: 0 /100 WBCS (ref 0–0)
PLATELET # BLD AUTO: 211 X10*3/UL (ref 150–450)
POTASSIUM SERPL-SCNC: 4.5 MMOL/L (ref 3.5–5.3)
PROT SERPL-MCNC: 5.9 G/DL (ref 6.4–8.2)
PROT SERPL-MCNC: 6.1 G/DL (ref 6.4–8.2)
RBC # BLD AUTO: 3.63 X10*6/UL (ref 4.5–5.9)
SODIUM SERPL-SCNC: 137 MMOL/L (ref 136–145)
TIBC SERPL-MCNC: 240 UG/DL (ref 240–445)
UIBC SERPL-MCNC: 137 UG/DL (ref 110–370)
WBC # BLD AUTO: 4.9 X10*3/UL (ref 4.4–11.3)

## 2024-08-28 PROCEDURE — 86320 SERUM IMMUNOELECTROPHORESIS: CPT | Performed by: INTERNAL MEDICINE

## 2024-08-28 PROCEDURE — 85025 COMPLETE CBC W/AUTO DIFF WBC: CPT | Performed by: INTERNAL MEDICINE

## 2024-08-28 PROCEDURE — 83540 ASSAY OF IRON: CPT | Performed by: INTERNAL MEDICINE

## 2024-08-28 PROCEDURE — 84155 ASSAY OF PROTEIN SERUM: CPT | Mod: PARLAB | Performed by: INTERNAL MEDICINE

## 2024-08-28 PROCEDURE — 80053 COMPREHEN METABOLIC PANEL: CPT | Performed by: INTERNAL MEDICINE

## 2024-08-28 PROCEDURE — 36415 COLL VENOUS BLD VENIPUNCTURE: CPT

## 2024-08-28 PROCEDURE — 83521 IG LIGHT CHAINS FREE EACH: CPT | Mod: PARLAB | Performed by: INTERNAL MEDICINE

## 2024-08-28 PROCEDURE — 84165 PROTEIN E-PHORESIS SERUM: CPT | Performed by: INTERNAL MEDICINE

## 2024-08-28 PROCEDURE — 82728 ASSAY OF FERRITIN: CPT | Mod: PARLAB | Performed by: INTERNAL MEDICINE

## 2024-08-28 PROCEDURE — 86334 IMMUNOFIX E-PHORESIS SERUM: CPT | Mod: PARLAB | Performed by: INTERNAL MEDICINE

## 2024-08-29 LAB
KAPPA LC SERPL-MCNC: 2.63 MG/DL (ref 0.33–1.94)
KAPPA LC/LAMBDA SER: 1.11 {RATIO} (ref 0.26–1.65)
LAMBDA LC SERPL-MCNC: 2.37 MG/DL (ref 0.57–2.63)

## 2024-08-31 LAB
ALBUMIN: 3.5 G/DL (ref 3.4–5)
ALPHA 1 GLOBULIN: 0.3 G/DL (ref 0.2–0.6)
ALPHA 2 GLOBULIN: 0.6 G/DL (ref 0.4–1.1)
BETA GLOBULIN: 0.6 G/DL (ref 0.5–1.2)
GAMMA GLOBULIN: 1.1 G/DL (ref 0.5–1.4)
IMMUNOFIXATION COMMENT: ABNORMAL
M-PROTEIN 1: 0.5 G/DL
PATH REVIEW - SERUM IMMUNOFIXATION: ABNORMAL
PATH REVIEW-SERUM PROTEIN ELECTROPHORESIS: ABNORMAL
PROTEIN ELECTROPHORESIS COMMENT: ABNORMAL

## 2024-09-03 ENCOUNTER — OFFICE VISIT (OUTPATIENT)
Dept: HEMATOLOGY/ONCOLOGY | Facility: CLINIC | Age: 89
End: 2024-09-03
Payer: MEDICARE

## 2024-09-03 VITALS
TEMPERATURE: 97 F | WEIGHT: 185.19 LBS | BODY MASS INDEX: 25.83 KG/M2 | SYSTOLIC BLOOD PRESSURE: 133 MMHG | DIASTOLIC BLOOD PRESSURE: 72 MMHG | OXYGEN SATURATION: 100 % | RESPIRATION RATE: 20 BRPM | HEART RATE: 54 BPM

## 2024-09-03 DIAGNOSIS — D50.9 IRON DEFICIENCY ANEMIA, UNSPECIFIED IRON DEFICIENCY ANEMIA TYPE: Primary | ICD-10-CM

## 2024-09-03 PROCEDURE — 1157F ADVNC CARE PLAN IN RCRD: CPT | Performed by: INTERNAL MEDICINE

## 2024-09-03 PROCEDURE — 1126F AMNT PAIN NOTED NONE PRSNT: CPT | Performed by: INTERNAL MEDICINE

## 2024-09-03 PROCEDURE — 3075F SYST BP GE 130 - 139MM HG: CPT | Performed by: INTERNAL MEDICINE

## 2024-09-03 PROCEDURE — 3078F DIAST BP <80 MM HG: CPT | Performed by: INTERNAL MEDICINE

## 2024-09-03 PROCEDURE — 99214 OFFICE O/P EST MOD 30 MIN: CPT | Performed by: INTERNAL MEDICINE

## 2024-09-03 ASSESSMENT — PAIN SCALES - GENERAL: PAINLEVEL: 0-NO PAIN

## 2024-09-03 NOTE — PROGRESS NOTES
LOCATION:  [facility] at [facility].     HEMATOLOGY & ONCOLOGY PROBLEMS:  1. Anemia      a. Iron deficiency due to poor oral iron absorption.      b. Maintained on intermittent IV iron infusions.  2. IgG Kappa monoclonal gammopathy (MGUS).     a. Currently being followed by close observation.       CHIEF COMPLAINT:    The patient is in the clinic today for management of anemia and monoclonal  gammopathy.               HISTORY:   Mr. Amato is 88 year old pleasant gentleman with multiple medical problems, who has also been noted to have chronic anemia. He was admitted at [facility] in Dec 2017 with CHF complications  and was also noted to be profoundly anemic with iron deficiency. EGD mainly showed a hiatal hernia and a colonoscopy showed a tubular adenoma without any evidence of further bleeding lesions. He was supported with IV iron infusion with Venofer. Follow-up  blood work from April 2018 showed persistent anemia with hemoglobin of 11.3. Ferritin was low at 11 and creatinine was 1.5. Post discharge, he was maintained on oral iron but with suboptimal response. He has been supported with intermittent courses of  IV iron infusion as an outpatient with Feraheme with good response. He was also been noted to have incidental monoclonal gammopathy during evaluation for anemia. Workup was suggestive of MGUS and is being followed with close observation without any intervention.  He had repeat extensive GI evaluation in the recent past.  Colonoscopy was unremarkable.  He had capsule endoscopy which was concerning for small intestinal bleed and there was concern regarding small intestinal mass.  He eventually had a CT enterography  which was essentially unremarkable.     INTERVAL HISTORY:  Denies any specific complaints.  Overall feeling better after receiving IV iron infusion few months ago.      PAST MEDICAL HISTORY:   1. CHF.  2. A. fib.  3. Right bundle-branch block.  4. Hypertension  5.  Hyperlipidemia  6. Chronic kidney disease  7. GERD  8. Carotid stenosis  9. BPH s/p laser surgery.  10. History of prostate cancer status post external beam radiation treatment in 2015.     SOCIAL HISTORY:    for 60 years and lives in [place]. Nonsmoker and nonalcoholic. He is a retired  from [employer].     FAMILY HISTORY:    Father  at age 60 from myocardial infarction. Mother  at age 90 from cancer and patient not aware of the details. No other  specific history of bleeding, clotting or malignant disorders in the family.     REVIEW OF SYSTEMS:  Pertinent finding as per the history above.  All other systems have been reviewed and generally negative and noncontributory.     PHYSICAL EXAMINATION:    Detailed examination not done.      ALLERGY & MEDICATIONS:  Allergies and latest outpatient medications list were reviewed in the EMR.    LAB RESULTS:  Latest CBC from 2024 showed a white cell count of 4.9 and hemoglobin of 11.7 and platelets of 211.  Metabolic profile was unremarkable other than glucose of 103.  Iron percentage saturation was 43 with ferritin of 231. SPEP  showed stable 0.5 g of IgG kappa monoclonal protein.  Free light chain levels were stable.  Last 3 sets of blood work were reviewed and the trend was noted.     ASSESSMENT AND PLAN:   1. Anemia. Please refer to the details of initial workup and management as outlined above. In summary he has been noted to have persistent iron  deficiency of unclear etiology. GI workup including EGD and colonoscopy was unremarkable in Dec 2017. He was supported with a course of IV iron infusion with Venofer in Dec 2017 but follow up labs from 2018 showed persistent iron deficiency. Additional  hematological evaluation revealed normal vitamin B12, folate, LDH, SPEP, haptoglobin and reticulocyte count etc. He had repeat extensive GI evaluation in the recent past.  Colonoscopy was unremarkable.  He had capsule endoscopy  which was concerning for  small intestinal bleed and there was concern regarding small intestinal mass.  He eventually had a CT enterography which was essentially unremarkable. He has been supported with intermittent courses of IV iron infusion as an outpatient with Feraheme with  good response.      Latest hemoglobin and iron stores are stable.  Clinically he is starting to feel slightly tired again.  I will support him with another course of Feraheme infusion in the next few weeks.  As before I again advised him to continue to follow up with GI clinic as advised by Dr. Metz.    2. MGUS. He was noted to have an incidental finding of monoclonal gammopathy during anemia workup. Overall findings are suggestive of MGUS and he is being followed with close observation without any intervention.      3. Follow up:  Patient will followup with me in 4 months.   Advised to contact us immediately if there are any new questions or problems.        This note has been transcribed using Dragon voice recognition system and there is a possibility of unintentional typing misprints.

## 2024-09-05 ENCOUNTER — APPOINTMENT (OUTPATIENT)
Dept: PRIMARY CARE | Facility: CLINIC | Age: 89
End: 2024-09-05
Payer: MEDICARE

## 2024-09-05 VITALS
HEART RATE: 56 BPM | SYSTOLIC BLOOD PRESSURE: 136 MMHG | TEMPERATURE: 96.9 F | BODY MASS INDEX: 25.81 KG/M2 | DIASTOLIC BLOOD PRESSURE: 80 MMHG | OXYGEN SATURATION: 91 % | WEIGHT: 184.4 LBS | HEIGHT: 71 IN

## 2024-09-05 DIAGNOSIS — I10 ESSENTIAL HYPERTENSION: Primary | ICD-10-CM

## 2024-09-05 DIAGNOSIS — N18.4 STAGE 4 CHRONIC KIDNEY DISEASE (MULTI): ICD-10-CM

## 2024-09-05 DIAGNOSIS — E78.00 HYPERCHOLESTEROLEMIA: ICD-10-CM

## 2024-09-05 LAB
CHOLEST SERPL-MCNC: 137 MG/DL (ref 0–199)
CHOLESTEROL/HDL RATIO: 4.1
HDLC SERPL-MCNC: 33.7 MG/DL
LDLC SERPL CALC-MCNC: 70 MG/DL
NON HDL CHOLESTEROL: 103 MG/DL (ref 0–149)
TRIGL SERPL-MCNC: 165 MG/DL (ref 0–149)
VLDL: 33 MG/DL (ref 0–40)

## 2024-09-05 PROCEDURE — 99214 OFFICE O/P EST MOD 30 MIN: CPT | Performed by: FAMILY MEDICINE

## 2024-09-05 PROCEDURE — 1160F RVW MEDS BY RX/DR IN RCRD: CPT | Performed by: FAMILY MEDICINE

## 2024-09-05 PROCEDURE — 90662 IIV NO PRSV INCREASED AG IM: CPT | Performed by: FAMILY MEDICINE

## 2024-09-05 PROCEDURE — 1036F TOBACCO NON-USER: CPT | Performed by: FAMILY MEDICINE

## 2024-09-05 PROCEDURE — 1157F ADVNC CARE PLAN IN RCRD: CPT | Performed by: FAMILY MEDICINE

## 2024-09-05 PROCEDURE — 80061 LIPID PANEL: CPT

## 2024-09-05 PROCEDURE — 1159F MED LIST DOCD IN RCRD: CPT | Performed by: FAMILY MEDICINE

## 2024-09-05 PROCEDURE — 1126F AMNT PAIN NOTED NONE PRSNT: CPT | Performed by: FAMILY MEDICINE

## 2024-09-05 PROCEDURE — 3079F DIAST BP 80-89 MM HG: CPT | Performed by: FAMILY MEDICINE

## 2024-09-05 PROCEDURE — G0008 ADMIN INFLUENZA VIRUS VAC: HCPCS | Performed by: FAMILY MEDICINE

## 2024-09-05 PROCEDURE — 3075F SYST BP GE 130 - 139MM HG: CPT | Performed by: FAMILY MEDICINE

## 2024-09-05 RX ORDER — LATANOPROST 50 UG/ML
SOLUTION/ DROPS OPHTHALMIC
COMMUNITY
Start: 2024-08-15

## 2024-09-05 ASSESSMENT — PAIN SCALES - GENERAL: PAINLEVEL: 0-NO PAIN

## 2024-09-05 NOTE — PROGRESS NOTES
Subjective   Patient ID: Jase Amato is a 89 y.o. male who presents for Follow-up (Patient is present today for a 6 month follow up, patient is fasting.).  HPI  89-year-old male for 6-month follow-up on hyperlipidemia, anxiety, chronic renal impairment.  He would like to discontinue anxiety medication.  His anxiety is well-controlled.  He initially started his anxiety medication after his wife passed away.  He is due for lipid panel.  Review of Systems  Constitutional: no chills, no fever and no night sweats.   Eyes: no blurred vision and no eyesight problems.   ENT: no hearing loss, no nasal congestion, no nasal discharge, no hoarseness and no sore throat.   Cardiovascular: no chest pain, no intermittent leg claudication, no lower extremity edema, no palpitations and no syncope.   Respiratory: no cough, no shortness of breath during exertion, no shortness of breath at rest and no wheezing.   Gastrointestinal: no abdominal pain, no blood in stools, no constipation, no diarrhea, no melena, no nausea, no rectal pain and no vomiting.   Genitourinary: no dysuria, no change in urinary frequency, no urinary hesitancy and no feelings of urinary urgency.   Neurological: no difficulty walking, no headache, no limb weakness, no numbness and no tingling.   Psychiatric: no anxiety, no depression, no anhedonia and no substance use disorders.   Endocrine: no recent weight gain and no recent weight loss.   Hematologic/Lymphatic: no tendency for easy bruising and no swollen glands.  Visit Vitals  /80 (BP Location: Left arm, Patient Position: Sitting, BP Cuff Size: Adult)   Pulse 56   Temp 36.1 °C (96.9 °F) (Temporal)        Objective   Physical Exam  Constitutional: Alert and in no acute distress. Well developed, well nourished.   Eyes: Pupils were equal in size, round, reactive to light (PERRL) with normal accommodation and extraocular movements intact (EOMI). Ophthalmoscopic examination: Normal.   Ears, Nose,  Mouth, and Throat: External inspection of ears and nose: Normal. Otoscopic examination: Normal. Lips, teeth, and gums: Normal. Oropharynx: Normal.   Neck: No neck mass was observed. Supple. Thyroid not enlarged and there were no palpable thyroid nodules.   Cardiovascular: Heart rate and rhythm were normal, normal S1 and S2, no gallops, no murmurs and no pericardial rub. Carotid pulses: Normal with no bruits. Abdominal aorta: Normal. Pedal pulses: Normal. No peripheral edema.   Pulmonary: No respiratory distress. Clear bilateral breath sounds.   Abdomen: Soft nontender; no abdominal mass palpated. Normal bowel sounds. No organomegaly.   Skin: Normal skin color and pigmentation, normal skin turgor, and no rash.   Psychiatric: Judgment and insight: Intact. Mood and affect: Normal.  Assessment/Plan   Problem List Items Addressed This Visit             ICD-10-CM    Hypercholesterolemia E78.00    Relevant Orders    Lipid panel    Stage 4 chronic kidney disease (Multi) N18.4    Essential hypertension - Primary I10

## 2024-10-28 ENCOUNTER — OFFICE VISIT (OUTPATIENT)
Dept: PRIMARY CARE | Facility: CLINIC | Age: 89
End: 2024-10-28
Payer: MEDICARE

## 2024-10-28 VITALS
BODY MASS INDEX: 25.62 KG/M2 | WEIGHT: 183 LBS | TEMPERATURE: 97.7 F | OXYGEN SATURATION: 99 % | SYSTOLIC BLOOD PRESSURE: 130 MMHG | HEIGHT: 71 IN | HEART RATE: 63 BPM | DIASTOLIC BLOOD PRESSURE: 62 MMHG

## 2024-10-28 DIAGNOSIS — H53.8 BLURRY VISION, RIGHT EYE: ICD-10-CM

## 2024-10-28 DIAGNOSIS — I10 ESSENTIAL HYPERTENSION: ICD-10-CM

## 2024-10-28 DIAGNOSIS — G44.209 ACUTE NON INTRACTABLE TENSION-TYPE HEADACHE: Primary | ICD-10-CM

## 2024-10-28 PROCEDURE — 1125F AMNT PAIN NOTED PAIN PRSNT: CPT | Performed by: FAMILY MEDICINE

## 2024-10-28 PROCEDURE — G2211 COMPLEX E/M VISIT ADD ON: HCPCS | Performed by: FAMILY MEDICINE

## 2024-10-28 PROCEDURE — 3075F SYST BP GE 130 - 139MM HG: CPT | Performed by: FAMILY MEDICINE

## 2024-10-28 PROCEDURE — 1157F ADVNC CARE PLAN IN RCRD: CPT | Performed by: FAMILY MEDICINE

## 2024-10-28 PROCEDURE — 99214 OFFICE O/P EST MOD 30 MIN: CPT | Performed by: FAMILY MEDICINE

## 2024-10-28 PROCEDURE — 3078F DIAST BP <80 MM HG: CPT | Performed by: FAMILY MEDICINE

## 2024-10-28 RX ORDER — METHYLPREDNISOLONE 4 MG/1
TABLET ORAL
Qty: 21 TABLET | Refills: 0 | Status: SHIPPED | OUTPATIENT
Start: 2024-10-28 | End: 2024-11-04

## 2024-10-28 ASSESSMENT — ENCOUNTER SYMPTOMS
NECK PAIN: 1
FEVER: 0
CHILLS: 0

## 2024-10-28 ASSESSMENT — PAIN SCALES - GENERAL: PAINLEVEL_OUTOF10: 8

## 2024-11-27 ENCOUNTER — OFFICE VISIT (OUTPATIENT)
Dept: CARDIOLOGY | Facility: CLINIC | Age: 89
End: 2024-11-27
Payer: MEDICARE

## 2024-11-27 VITALS
OXYGEN SATURATION: 94 % | WEIGHT: 186 LBS | RESPIRATION RATE: 16 BRPM | DIASTOLIC BLOOD PRESSURE: 72 MMHG | BODY MASS INDEX: 25.94 KG/M2 | SYSTOLIC BLOOD PRESSURE: 122 MMHG | HEART RATE: 53 BPM

## 2024-11-27 DIAGNOSIS — C61 PROSTATIC CANCER (MULTI): ICD-10-CM

## 2024-11-27 DIAGNOSIS — I10 ESSENTIAL HYPERTENSION: ICD-10-CM

## 2024-11-27 DIAGNOSIS — I50.30 NYHA CLASS 2 HEART FAILURE WITH PRESERVED EJECTION FRACTION: ICD-10-CM

## 2024-11-27 DIAGNOSIS — I48.0 PAROXYSMAL ATRIAL FIBRILLATION (MULTI): ICD-10-CM

## 2024-11-27 DIAGNOSIS — I42.9 CARDIOMYOPATHY, UNSPECIFIED TYPE (MULTI): ICD-10-CM

## 2024-11-27 DIAGNOSIS — N18.4 STAGE 4 CHRONIC KIDNEY DISEASE (MULTI): ICD-10-CM

## 2024-11-27 DIAGNOSIS — I45.10 COMPLETE RIGHT BUNDLE BRANCH BLOCK: Primary | ICD-10-CM

## 2024-11-27 DIAGNOSIS — E78.00 HYPERCHOLESTEROLEMIA: ICD-10-CM

## 2024-11-27 DIAGNOSIS — I50.32 CHRONIC DIASTOLIC CONGESTIVE HEART FAILURE: ICD-10-CM

## 2024-11-27 PROCEDURE — 1160F RVW MEDS BY RX/DR IN RCRD: CPT | Performed by: INTERNAL MEDICINE

## 2024-11-27 PROCEDURE — 1159F MED LIST DOCD IN RCRD: CPT | Performed by: INTERNAL MEDICINE

## 2024-11-27 PROCEDURE — 1157F ADVNC CARE PLAN IN RCRD: CPT | Performed by: INTERNAL MEDICINE

## 2024-11-27 PROCEDURE — 99214 OFFICE O/P EST MOD 30 MIN: CPT | Performed by: INTERNAL MEDICINE

## 2024-11-27 PROCEDURE — 1036F TOBACCO NON-USER: CPT | Performed by: INTERNAL MEDICINE

## 2024-11-27 PROCEDURE — 3074F SYST BP LT 130 MM HG: CPT | Performed by: INTERNAL MEDICINE

## 2024-11-27 PROCEDURE — 3078F DIAST BP <80 MM HG: CPT | Performed by: INTERNAL MEDICINE

## 2024-11-27 NOTE — PROGRESS NOTES
Subjective  Jase Amato  is a 89 y.o. year old male who presents for paroxysmal atrial fibrillation.  No dyspnea, no chest pain, no dyspnea, no edema    Blood pressure 138/74, pulse 53, resp. rate 16, weight 84.4 kg (186 lb), SpO2 94%.   Patient has no known allergies.  Past Medical History:   Diagnosis Date    Chronic systolic (congestive) heart failure     Chronic systolic congestive heart failure    Dilated cardiomyopathy (Multi)     Secondary dilated cardiomyopathy    Dizziness and giddiness     Light headedness    Encounter for follow-up examination after completed treatment for conditions other than malignant neoplasm 08/09/2022    Hospital discharge follow-up    Encounter for preprocedural laboratory examination     Pre-procedure lab exam    Essential (primary) hypertension     Benign essential HTN    Gastrointestinal hemorrhage, unspecified 11/04/2021    Chronic upper gastrointestinal bleeding    Generalized anxiety disorder 08/30/2022    Anxiety, generalized    Impacted cerumen, left ear 08/30/2022    Hearing loss due to cerumen impaction, left    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    Iron deficiency anemia due to chronic blood loss    Personal history of diseases of the blood and blood-forming organs and certain disorders involving the immune mechanism 09/02/2021    History of anemia    Personal history of diseases of the blood and blood-forming organs and certain disorders involving the immune mechanism 05/09/2018    History of iron deficiency anemia    Personal history of malignant neoplasm of prostate     History of malignant neoplasm of prostate    Personal history of other benign neoplasm 11/04/2021    History of other benign neoplasm    Personal history of other diseases of the circulatory system 08/11/2022    History of carotid artery stenosis    Personal history of other diseases of the circulatory system     History of cardiomyopathy    Personal history  of other diseases of the circulatory system     Atrial fibrillation, currently in sinus rhythm    Personal history of other diseases of the circulatory system     History of atrial fibrillation    Personal history of other diseases of the nervous system and sense organs 08/30/2022    History of acute otitis externa    Personal history of other endocrine, nutritional and metabolic disease     History of hyperlipidemia    Personal history of other specified conditions 05/18/2022    History of palpitations    Personal history of other specified conditions     History of urinary retention    Unspecified fall, subsequent encounter 01/06/2022    Accidental fall, subsequent encounter     Past Surgical History:   Procedure Laterality Date    MR HEAD ANGIO WO IV CONTRAST  7/28/2022    MR HEAD ANGIO WO IV CONTRAST 7/28/2022 [facility]    MR NECK ANGIO WO IV CONTRAST  7/28/2022    MR NECK ANGIO WO IV CONTRAST 7/28/2022 [facility]    PROSTATE SURGERY  01/25/2018    Prostate Surgery     No family history on file.  @[facility]    Current Outpatient Medications   Medication Sig Dispense Refill    atorvastatin (Lipitor) 40 mg tablet TAKE 1 TABLET BY MOUTH DAILY 90 tablet 3    furosemide (Lasix) 40 mg tablet TAKE 1 TABLET BY MOUTH 3 TIMES  WEEKLY 39 tablet 3    latanoprost (Xalatan) 0.005 % ophthalmic solution INSTILL 1 DROP IN RIGHT EYE EVERY EVENING      lisinopril 2.5 mg tablet TAKE 1 TABLET BY MOUTH DAILY 90 tablet 3    metoprolol succinate XL (Toprol-XL) 50 mg 24 hr tablet TAKE 1 TABLET BY MOUTH DAILY 90 tablet 3    Xarelto 20 mg tablet TAKE 1 TABLET BY MOUTH DAILY 90 tablet 3     No current facility-administered medications for this visit.        ROS  Review of Systems   All other systems reviewed and are negative.      Physical Exam  Physical Exam  Constitutional:       Appearance: Normal appearance.   HENT:      Head: Normocephalic and atraumatic.   Cardiovascular:      Rate and Rhythm: Normal rate.       Comments: S2 widely split  Abdominal:      General: Abdomen is flat.   Musculoskeletal:      Right lower leg: No edema.      Left lower leg: No edema.   Skin:     General: Skin is warm and dry.   Neurological:      General: No focal deficit present.      Mental Status: He is alert and oriented to person, place, and time.   Psychiatric:         Mood and Affect: Mood normal.         Behavior: Behavior normal.          EKG  Encounter Date: 08/27/24   ECG 12 Lead    Narrative    Sinus bradycardia at 52/min.,  RBBB       Problem List Items Addressed This Visit       Chronic diastolic congestive heart failure    Complete right bundle branch block - Primary    Hypercholesterolemia    Paroxysmal atrial fibrillation (Multi)    Prostatic cancer (Multi)    Stage 4 chronic kidney disease (Multi)    NYHA class 2 heart failure with preserved ejection fraction     7/29/22 echocardiogram LVEf = 65-70% with mild to moderate aortic insufficincy         Essential hypertension    Cardiomyopathy     Tachycardia related resolved 4/20/15              Same meds with return 3 months with EKG      Mike Gutiérrez MD

## 2024-12-27 ENCOUNTER — LAB (OUTPATIENT)
Dept: LAB | Facility: CLINIC | Age: 89
End: 2024-12-27
Payer: MEDICARE

## 2024-12-27 DIAGNOSIS — D50.9 IRON DEFICIENCY ANEMIA, UNSPECIFIED IRON DEFICIENCY ANEMIA TYPE: ICD-10-CM

## 2024-12-27 LAB
ALBUMIN SERPL BCP-MCNC: 3.9 G/DL (ref 3.4–5)
ALP SERPL-CCNC: 82 U/L (ref 33–136)
ALT SERPL W P-5'-P-CCNC: 14 U/L (ref 10–52)
ANION GAP SERPL CALC-SCNC: 11 MMOL/L (ref 10–20)
AST SERPL W P-5'-P-CCNC: 16 U/L (ref 9–39)
BASOPHILS # BLD AUTO: 0.02 X10*3/UL (ref 0–0.1)
BASOPHILS NFR BLD AUTO: 0.4 %
BILIRUB SERPL-MCNC: 0.6 MG/DL (ref 0–1.2)
BUN SERPL-MCNC: 19 MG/DL (ref 6–23)
CALCIUM SERPL-MCNC: 9 MG/DL (ref 8.6–10.3)
CHLORIDE SERPL-SCNC: 104 MMOL/L (ref 98–107)
CO2 SERPL-SCNC: 26 MMOL/L (ref 21–32)
CREAT SERPL-MCNC: 1.18 MG/DL (ref 0.5–1.3)
EGFRCR SERPLBLD CKD-EPI 2021: 59 ML/MIN/1.73M*2
EOSINOPHIL # BLD AUTO: 0.08 X10*3/UL (ref 0–0.4)
EOSINOPHIL NFR BLD AUTO: 1.8 %
ERYTHROCYTE [DISTWIDTH] IN BLOOD BY AUTOMATED COUNT: 11.7 % (ref 11.5–14.5)
FERRITIN SERPL-MCNC: 21 NG/ML (ref 20–300)
GLUCOSE SERPL-MCNC: 117 MG/DL (ref 74–99)
HCT VFR BLD AUTO: 34.8 % (ref 41–52)
HGB BLD-MCNC: 11.6 G/DL (ref 13.5–17.5)
IMM GRANULOCYTES # BLD AUTO: 0.02 X10*3/UL (ref 0–0.5)
IMM GRANULOCYTES NFR BLD AUTO: 0.4 % (ref 0–0.9)
IRON SATN MFR SERPL: 21 % (ref 25–45)
IRON SERPL-MCNC: 79 UG/DL (ref 35–150)
LYMPHOCYTES # BLD AUTO: 1.46 X10*3/UL (ref 0.8–3)
LYMPHOCYTES NFR BLD AUTO: 31.9 %
MCH RBC QN AUTO: 32.8 PG (ref 26–34)
MCHC RBC AUTO-ENTMCNC: 33.3 G/DL (ref 32–36)
MCV RBC AUTO: 98 FL (ref 80–100)
MONOCYTES # BLD AUTO: 0.49 X10*3/UL (ref 0.05–0.8)
MONOCYTES NFR BLD AUTO: 10.7 %
NEUTROPHILS # BLD AUTO: 2.5 X10*3/UL (ref 1.6–5.5)
NEUTROPHILS NFR BLD AUTO: 54.8 %
NRBC BLD-RTO: 0 /100 WBCS (ref 0–0)
PLATELET # BLD AUTO: 231 X10*3/UL (ref 150–450)
POTASSIUM SERPL-SCNC: 4.5 MMOL/L (ref 3.5–5.3)
PROT SERPL-MCNC: 6.4 G/DL (ref 6.4–8.2)
RBC # BLD AUTO: 3.54 X10*6/UL (ref 4.5–5.9)
SODIUM SERPL-SCNC: 136 MMOL/L (ref 136–145)
TIBC SERPL-MCNC: 370 UG/DL (ref 240–445)
UIBC SERPL-MCNC: 291 UG/DL (ref 110–370)
WBC # BLD AUTO: 4.6 X10*3/UL (ref 4.4–11.3)

## 2024-12-27 PROCEDURE — 36415 COLL VENOUS BLD VENIPUNCTURE: CPT

## 2024-12-27 PROCEDURE — 82728 ASSAY OF FERRITIN: CPT | Mod: PARLAB

## 2024-12-27 PROCEDURE — 84075 ASSAY ALKALINE PHOSPHATASE: CPT

## 2024-12-27 PROCEDURE — 83540 ASSAY OF IRON: CPT

## 2024-12-27 PROCEDURE — 85025 COMPLETE CBC W/AUTO DIFF WBC: CPT

## 2025-01-01 DIAGNOSIS — I48.0 PAROXYSMAL ATRIAL FIBRILLATION (MULTI): ICD-10-CM

## 2025-01-01 DIAGNOSIS — I50.32 CHRONIC DIASTOLIC CONGESTIVE HEART FAILURE: ICD-10-CM

## 2025-01-02 RX ORDER — METOPROLOL SUCCINATE 50 MG/1
50 TABLET, EXTENDED RELEASE ORAL DAILY
Qty: 90 TABLET | Refills: 3 | Status: SHIPPED | OUTPATIENT
Start: 2025-01-02

## 2025-01-02 RX ORDER — RIVAROXABAN 20 MG/1
20 TABLET, FILM COATED ORAL DAILY
Qty: 90 TABLET | Refills: 3 | Status: SHIPPED | OUTPATIENT
Start: 2025-01-02

## 2025-01-02 RX ORDER — ATORVASTATIN CALCIUM 40 MG/1
40 TABLET, FILM COATED ORAL DAILY
Qty: 90 TABLET | Refills: 3 | Status: SHIPPED | OUTPATIENT
Start: 2025-01-02

## 2025-01-03 ENCOUNTER — APPOINTMENT (OUTPATIENT)
Dept: HEMATOLOGY/ONCOLOGY | Facility: CLINIC | Age: 89
End: 2025-01-03
Payer: MEDICARE

## 2025-01-03 ENCOUNTER — OFFICE VISIT (OUTPATIENT)
Dept: HEMATOLOGY/ONCOLOGY | Facility: CLINIC | Age: 89
End: 2025-01-03
Payer: MEDICARE

## 2025-01-03 VITALS
HEART RATE: 68 BPM | WEIGHT: 187.39 LBS | DIASTOLIC BLOOD PRESSURE: 75 MMHG | SYSTOLIC BLOOD PRESSURE: 141 MMHG | RESPIRATION RATE: 20 BRPM | BODY MASS INDEX: 26.14 KG/M2 | OXYGEN SATURATION: 98 % | TEMPERATURE: 97.7 F

## 2025-01-03 DIAGNOSIS — D50.9 IRON DEFICIENCY ANEMIA, UNSPECIFIED IRON DEFICIENCY ANEMIA TYPE: Primary | ICD-10-CM

## 2025-01-03 PROCEDURE — 3078F DIAST BP <80 MM HG: CPT | Performed by: INTERNAL MEDICINE

## 2025-01-03 PROCEDURE — 1159F MED LIST DOCD IN RCRD: CPT | Performed by: INTERNAL MEDICINE

## 2025-01-03 PROCEDURE — 1157F ADVNC CARE PLAN IN RCRD: CPT | Performed by: INTERNAL MEDICINE

## 2025-01-03 PROCEDURE — 99213 OFFICE O/P EST LOW 20 MIN: CPT | Performed by: INTERNAL MEDICINE

## 2025-01-03 PROCEDURE — 1126F AMNT PAIN NOTED NONE PRSNT: CPT | Performed by: INTERNAL MEDICINE

## 2025-01-03 PROCEDURE — 3077F SYST BP >= 140 MM HG: CPT | Performed by: INTERNAL MEDICINE

## 2025-01-03 ASSESSMENT — PAIN SCALES - GENERAL: PAINLEVEL_OUTOF10: 0-NO PAIN

## 2025-01-03 NOTE — PROGRESS NOTES
LOCATION:  [facility] at [facility].     HEMATOLOGY & ONCOLOGY PROBLEMS:  1. Anemia      a. Iron deficiency due to poor oral iron absorption.      b. Maintained on intermittent IV iron infusions.  2. IgG Kappa monoclonal gammopathy (MGUS).     a. Currently being followed by close observation.       CHIEF COMPLAINT:    The patient is in the clinic today for management of anemia and monoclonal  gammopathy.               HISTORY:   Mr. Amato is 88 year old pleasant gentleman with multiple medical problems, who has also been noted to have chronic anemia. He was admitted at [facility] in Dec 2017 with CHF complications  and was also noted to be profoundly anemic with iron deficiency. EGD mainly showed a hiatal hernia and a colonoscopy showed a tubular adenoma without any evidence of further bleeding lesions. He was supported with IV iron infusion with Venofer. Follow-up  blood work from April 2018 showed persistent anemia with hemoglobin of 11.3. Ferritin was low at 11 and creatinine was 1.5. Post discharge, he was maintained on oral iron but with suboptimal response. He has been supported with intermittent courses of  IV iron infusion as an outpatient with Feraheme with good response. He was also been noted to have incidental monoclonal gammopathy during evaluation for anemia. Workup was suggestive of MGUS and is being followed with close observation without any intervention.  He had repeat extensive GI evaluation in the recent past.  Colonoscopy was unremarkable.  He had capsule endoscopy which was concerning for small intestinal bleed and there was concern regarding small intestinal mass.  He eventually had a CT enterography  which was essentially unremarkable.     INTERVAL HISTORY:  Denies any specific complaints.  Overall feeling better after receiving IV iron infusion few months ago.  No other localizing sign and symptoms.     PAST MEDICAL HISTORY:   1. CHF.  2. A. fib.  3. Right  bundle-branch block.  4. Hypertension  5. Hyperlipidemia  6. Chronic kidney disease  7. GERD  8. Carotid stenosis  9. BPH s/p laser surgery.  10. History of prostate cancer status post external beam radiation treatment in 2015.     SOCIAL HISTORY:    for 60 years and lives in [place]. Nonsmoker and nonalcoholic. He is a retired  from [employer].     FAMILY HISTORY:    Father  at age 60 from myocardial infarction. Mother  at age 90 from cancer and patient not aware of the details. No other  specific history of bleeding, clotting or malignant disorders in the family.     REVIEW OF SYSTEMS:  Pertinent finding as per the history above.  All other systems have been reviewed and generally negative and noncontributory.     PHYSICAL EXAMINATION:    Detailed examination not done.      ALLERGY & MEDICATIONS:  Allergies and latest outpatient medications list were reviewed in the EMR.    LAB RESULTS:  Latest CBC from 2024 showed a white cell count of 4.6 and hemoglobin of 11.6 and platelets of 231.  Metabolic profile was unremarkable other than glucose of 103.  Iron percentage saturation was 21 with ferritin of 21.  Last SPEP from 2024 showed stable 0.5 g of IgG kappa monoclonal protein.  Free light chain levels were stable.  Last 3 sets of blood work were reviewed and the trend was noted.     ASSESSMENT AND PLAN:   1. Anemia. Please refer to the details of initial workup and management as outlined above. In summary he has been noted to have persistent iron  deficiency of unclear etiology. GI workup including EGD and colonoscopy was unremarkable in Dec 2017. He was supported with a course of IV iron infusion with Venofer in Dec 2017 but follow up labs from 2018 showed persistent iron deficiency. Additional  hematological evaluation revealed normal vitamin B12, folate, LDH, SPEP, haptoglobin and reticulocyte count etc. He had repeat extensive GI evaluation in the recent past.   Colonoscopy was unremarkable.  He had capsule endoscopy which was concerning for  small intestinal bleed and there was concern regarding small intestinal mass.  He eventually had a CT enterography which was essentially unremarkable. He has been supported with intermittent courses of IV iron infusion as an outpatient with Feraheme with  good response.      Latest hemoglobin is stable but iron stores are gradually declining.  Clinically he feels fine right now.  No immediate need for IV iron infusion but based on his ferritin level, most likely he will end up needing it again in about 3 months.  As before I again advised him to continue to follow up with GI clinic as advised by Dr. Metz.    2. MGUS. He was noted to have an incidental finding of monoclonal gammopathy during anemia workup. Overall findings are suggestive of MGUS and he is being followed with close observation without any intervention.      3. Follow up:  Patient will followup with me in 3 months.   Advised to contact us immediately if there are any new questions or problems.        This note has been transcribed using Dragon voice recognition system and there is a possibility of unintentional typing misprints.

## 2025-01-20 ENCOUNTER — TELEPHONE (OUTPATIENT)
Dept: HEMATOLOGY/ONCOLOGY | Facility: CLINIC | Age: 89
End: 2025-01-20
Payer: MEDICARE

## 2025-01-20 DIAGNOSIS — D50.9 IRON DEFICIENCY ANEMIA, UNSPECIFIED IRON DEFICIENCY ANEMIA TYPE: ICD-10-CM

## 2025-01-20 NOTE — TELEPHONE ENCOUNTER
Dr. Walker patient. Patient called is not feeling so great, tired and no energy thinks he needs Iron. I talked to Dr. Walker and under the direction of the physician, I let patient know that we are putting in more labs and want him to come in and get them drawn so the doctor can review them.

## 2025-01-21 ENCOUNTER — LAB (OUTPATIENT)
Dept: LAB | Facility: CLINIC | Age: 89
End: 2025-01-21
Payer: MEDICARE

## 2025-01-21 DIAGNOSIS — D50.9 IRON DEFICIENCY ANEMIA, UNSPECIFIED IRON DEFICIENCY ANEMIA TYPE: ICD-10-CM

## 2025-01-21 LAB
ALBUMIN SERPL BCP-MCNC: 3.8 G/DL (ref 3.4–5)
ALP SERPL-CCNC: 76 U/L (ref 33–136)
ALT SERPL W P-5'-P-CCNC: 14 U/L (ref 10–52)
ANION GAP SERPL CALC-SCNC: 10 MMOL/L (ref 10–20)
AST SERPL W P-5'-P-CCNC: 15 U/L (ref 9–39)
BASOPHILS # BLD AUTO: 0.03 X10*3/UL (ref 0–0.1)
BASOPHILS NFR BLD AUTO: 0.5 %
BILIRUB SERPL-MCNC: 0.6 MG/DL (ref 0–1.2)
BUN SERPL-MCNC: 23 MG/DL (ref 6–23)
CALCIUM SERPL-MCNC: 8.6 MG/DL (ref 8.6–10.3)
CHLORIDE SERPL-SCNC: 102 MMOL/L (ref 98–107)
CO2 SERPL-SCNC: 27 MMOL/L (ref 21–32)
CREAT SERPL-MCNC: 1.18 MG/DL (ref 0.5–1.3)
EGFRCR SERPLBLD CKD-EPI 2021: 59 ML/MIN/1.73M*2
EOSINOPHIL # BLD AUTO: 0.03 X10*3/UL (ref 0–0.4)
EOSINOPHIL NFR BLD AUTO: 0.5 %
ERYTHROCYTE [DISTWIDTH] IN BLOOD BY AUTOMATED COUNT: 11.9 % (ref 11.5–14.5)
FERRITIN SERPL-MCNC: 20 NG/ML (ref 20–300)
GLUCOSE SERPL-MCNC: 127 MG/DL (ref 74–99)
HCT VFR BLD AUTO: 35.3 % (ref 41–52)
HGB BLD-MCNC: 11.5 G/DL (ref 13.5–17.5)
IMM GRANULOCYTES # BLD AUTO: 0.02 X10*3/UL (ref 0–0.5)
IMM GRANULOCYTES NFR BLD AUTO: 0.3 % (ref 0–0.9)
IRON SATN MFR SERPL: 18 % (ref 25–45)
IRON SERPL-MCNC: 65 UG/DL (ref 35–150)
LYMPHOCYTES # BLD AUTO: 1.26 X10*3/UL (ref 0.8–3)
LYMPHOCYTES NFR BLD AUTO: 21.3 %
MCH RBC QN AUTO: 31.9 PG (ref 26–34)
MCHC RBC AUTO-ENTMCNC: 32.6 G/DL (ref 32–36)
MCV RBC AUTO: 98 FL (ref 80–100)
MONOCYTES # BLD AUTO: 0.5 X10*3/UL (ref 0.05–0.8)
MONOCYTES NFR BLD AUTO: 8.5 %
NEUTROPHILS # BLD AUTO: 4.07 X10*3/UL (ref 1.6–5.5)
NEUTROPHILS NFR BLD AUTO: 68.9 %
NRBC BLD-RTO: 0 /100 WBCS (ref 0–0)
PLATELET # BLD AUTO: 227 X10*3/UL (ref 150–450)
POTASSIUM SERPL-SCNC: 4.1 MMOL/L (ref 3.5–5.3)
PROT SERPL-MCNC: 6.8 G/DL (ref 6.4–8.2)
RBC # BLD AUTO: 3.6 X10*6/UL (ref 4.5–5.9)
SODIUM SERPL-SCNC: 135 MMOL/L (ref 136–145)
TIBC SERPL-MCNC: 370 UG/DL (ref 240–445)
UIBC SERPL-MCNC: 305 UG/DL (ref 110–370)
WBC # BLD AUTO: 5.9 X10*3/UL (ref 4.4–11.3)

## 2025-01-21 PROCEDURE — 36415 COLL VENOUS BLD VENIPUNCTURE: CPT

## 2025-01-21 PROCEDURE — 83550 IRON BINDING TEST: CPT

## 2025-01-21 PROCEDURE — 85025 COMPLETE CBC W/AUTO DIFF WBC: CPT

## 2025-01-21 PROCEDURE — 82728 ASSAY OF FERRITIN: CPT | Mod: PARLAB

## 2025-01-21 PROCEDURE — 84075 ASSAY ALKALINE PHOSPHATASE: CPT

## 2025-01-22 ENCOUNTER — OFFICE VISIT (OUTPATIENT)
Dept: PRIMARY CARE | Facility: CLINIC | Age: 89
End: 2025-01-22
Payer: MEDICARE

## 2025-01-22 VITALS
BODY MASS INDEX: 26.43 KG/M2 | HEART RATE: 66 BPM | SYSTOLIC BLOOD PRESSURE: 138 MMHG | HEIGHT: 71 IN | TEMPERATURE: 97.4 F | OXYGEN SATURATION: 97 % | WEIGHT: 188.8 LBS | DIASTOLIC BLOOD PRESSURE: 70 MMHG

## 2025-01-22 DIAGNOSIS — N48.83 ACQUIRED BURIED PENIS: Primary | ICD-10-CM

## 2025-01-22 DIAGNOSIS — I50.9 HEART FAILURE, UNSPECIFIED HF CHRONICITY, UNSPECIFIED HEART FAILURE TYPE: ICD-10-CM

## 2025-01-22 DIAGNOSIS — C61 PROSTATIC CANCER (MULTI): ICD-10-CM

## 2025-01-22 DIAGNOSIS — I48.91 ATRIAL FIBRILLATION, UNSPECIFIED TYPE (MULTI): ICD-10-CM

## 2025-01-22 PROCEDURE — 1160F RVW MEDS BY RX/DR IN RCRD: CPT | Performed by: FAMILY MEDICINE

## 2025-01-22 PROCEDURE — 1036F TOBACCO NON-USER: CPT | Performed by: FAMILY MEDICINE

## 2025-01-22 PROCEDURE — 3075F SYST BP GE 130 - 139MM HG: CPT | Performed by: FAMILY MEDICINE

## 2025-01-22 PROCEDURE — 1157F ADVNC CARE PLAN IN RCRD: CPT | Performed by: FAMILY MEDICINE

## 2025-01-22 PROCEDURE — 1126F AMNT PAIN NOTED NONE PRSNT: CPT | Performed by: FAMILY MEDICINE

## 2025-01-22 PROCEDURE — 99214 OFFICE O/P EST MOD 30 MIN: CPT | Performed by: FAMILY MEDICINE

## 2025-01-22 PROCEDURE — 1159F MED LIST DOCD IN RCRD: CPT | Performed by: FAMILY MEDICINE

## 2025-01-22 PROCEDURE — G2211 COMPLEX E/M VISIT ADD ON: HCPCS | Performed by: FAMILY MEDICINE

## 2025-01-22 PROCEDURE — 3078F DIAST BP <80 MM HG: CPT | Performed by: FAMILY MEDICINE

## 2025-01-22 ASSESSMENT — PAIN SCALES - GENERAL: PAINLEVEL_OUTOF10: 0-NO PAIN

## 2025-01-22 ASSESSMENT — PATIENT HEALTH QUESTIONNAIRE - PHQ9
SUM OF ALL RESPONSES TO PHQ9 QUESTIONS 1 AND 2: 0
2. FEELING DOWN, DEPRESSED OR HOPELESS: NOT AT ALL
1. LITTLE INTEREST OR PLEASURE IN DOING THINGS: NOT AT ALL

## 2025-01-22 ASSESSMENT — ENCOUNTER SYMPTOMS
LOSS OF SENSATION IN FEET: 0
DEPRESSION: 0
OCCASIONAL FEELINGS OF UNSTEADINESS: 0

## 2025-01-22 NOTE — PROGRESS NOTES
Subjective   Patient ID: Jase Amato is a 89 y.o. male who presents for misc (Consultation ).  HPI  89-year-old male with history of prostate issues presents with complaints of possible buried penis.  He states he is having a bit difficult time finding his penis.  Feels like it is retracted.  No other urological symptoms at this time.  Heart failure stable.  Atrial fibrillation is stable.  Review of Systems   Genitourinary: Negative.    All other systems reviewed and are negative.    See HPI  Visit Vitals  /70 (BP Location: Left arm)   Pulse 66   Temp 36.3 °C (97.4 °F) (Temporal)        Objective   Physical Exam  Vitals reviewed.   Constitutional:       Appearance: Normal appearance.   Cardiovascular:      Rate and Rhythm: Normal rate and regular rhythm.      Heart sounds: Normal heart sounds.   Pulmonary:      Breath sounds: Normal breath sounds.   Abdominal:      Palpations: Abdomen is soft.      Tenderness: There is no abdominal tenderness.   Genitourinary:     Testes: Normal.      Comments: Penis is retracted.  Musculoskeletal:         General: No swelling.   Neurological:      General: No focal deficit present.      Mental Status: He is alert.   Psychiatric:         Mood and Affect: Mood normal.         Behavior: Behavior normal.         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

## 2025-01-23 ENCOUNTER — APPOINTMENT (OUTPATIENT)
Dept: PRIMARY CARE | Facility: CLINIC | Age: 89
End: 2025-01-23
Payer: MEDICARE

## 2025-01-27 ENCOUNTER — TELEPHONE (OUTPATIENT)
Dept: HEMATOLOGY/ONCOLOGY | Facility: CLINIC | Age: 89
End: 2025-01-27
Payer: MEDICARE

## 2025-01-27 RX ORDER — HEPARIN 100 UNIT/ML
500 SYRINGE INTRAVENOUS AS NEEDED
Status: CANCELLED | OUTPATIENT
Start: 2025-01-27

## 2025-01-27 RX ORDER — DIPHENHYDRAMINE HYDROCHLORIDE 50 MG/ML
50 INJECTION INTRAMUSCULAR; INTRAVENOUS AS NEEDED
Status: CANCELLED | OUTPATIENT
Start: 2025-02-10

## 2025-01-27 RX ORDER — ALBUTEROL SULFATE 0.83 MG/ML
3 SOLUTION RESPIRATORY (INHALATION) AS NEEDED
Status: CANCELLED | OUTPATIENT
Start: 2025-02-10

## 2025-01-27 RX ORDER — EPINEPHRINE 0.3 MG/.3ML
0.3 INJECTION SUBCUTANEOUS EVERY 5 MIN PRN
Status: CANCELLED | OUTPATIENT
Start: 2025-02-10

## 2025-01-27 RX ORDER — FAMOTIDINE 10 MG/ML
20 INJECTION INTRAVENOUS ONCE AS NEEDED
Status: CANCELLED | OUTPATIENT
Start: 2025-02-10

## 2025-01-27 RX ORDER — HEPARIN SODIUM,PORCINE/PF 10 UNIT/ML
50 SYRINGE (ML) INTRAVENOUS AS NEEDED
Status: CANCELLED | OUTPATIENT
Start: 2025-01-27

## 2025-01-27 NOTE — TELEPHONE ENCOUNTER
Dr. Walker patient. Patient called wanted to go over his labs he had done on 1-21-25. Phone # [phone]

## 2025-01-30 ENCOUNTER — OFFICE VISIT (OUTPATIENT)
Dept: PRIMARY CARE | Facility: CLINIC | Age: 89
End: 2025-01-30
Payer: MEDICARE

## 2025-01-30 VITALS
HEART RATE: 73 BPM | SYSTOLIC BLOOD PRESSURE: 120 MMHG | WEIGHT: 187.6 LBS | OXYGEN SATURATION: 98 % | BODY MASS INDEX: 26.26 KG/M2 | DIASTOLIC BLOOD PRESSURE: 74 MMHG | HEIGHT: 71 IN

## 2025-01-30 DIAGNOSIS — F41.9 ANXIETY: Primary | ICD-10-CM

## 2025-01-30 DIAGNOSIS — I10 ESSENTIAL HYPERTENSION: ICD-10-CM

## 2025-01-30 PROCEDURE — 99214 OFFICE O/P EST MOD 30 MIN: CPT | Performed by: FAMILY MEDICINE

## 2025-01-30 PROCEDURE — 1124F ACP DISCUSS-NO DSCNMKR DOCD: CPT | Performed by: FAMILY MEDICINE

## 2025-01-30 PROCEDURE — G2211 COMPLEX E/M VISIT ADD ON: HCPCS | Performed by: FAMILY MEDICINE

## 2025-01-30 PROCEDURE — 1036F TOBACCO NON-USER: CPT | Performed by: FAMILY MEDICINE

## 2025-01-30 PROCEDURE — 1126F AMNT PAIN NOTED NONE PRSNT: CPT | Performed by: FAMILY MEDICINE

## 2025-01-30 PROCEDURE — 3078F DIAST BP <80 MM HG: CPT | Performed by: FAMILY MEDICINE

## 2025-01-30 PROCEDURE — 1157F ADVNC CARE PLAN IN RCRD: CPT | Performed by: FAMILY MEDICINE

## 2025-01-30 PROCEDURE — 1159F MED LIST DOCD IN RCRD: CPT | Performed by: FAMILY MEDICINE

## 2025-01-30 PROCEDURE — 3074F SYST BP LT 130 MM HG: CPT | Performed by: FAMILY MEDICINE

## 2025-01-30 PROCEDURE — 1160F RVW MEDS BY RX/DR IN RCRD: CPT | Performed by: FAMILY MEDICINE

## 2025-01-30 RX ORDER — ESCITALOPRAM OXALATE 10 MG/1
10 TABLET ORAL DAILY
Qty: 90 TABLET | Refills: 3 | Status: SHIPPED | OUTPATIENT
Start: 2025-01-30 | End: 2026-01-30

## 2025-01-30 ASSESSMENT — PAIN SCALES - GENERAL: PAINLEVEL_OUTOF10: 0-NO PAIN

## 2025-01-30 ASSESSMENT — PATIENT HEALTH QUESTIONNAIRE - PHQ9
1. LITTLE INTEREST OR PLEASURE IN DOING THINGS: NOT AT ALL
2. FEELING DOWN, DEPRESSED OR HOPELESS: NOT AT ALL
SUM OF ALL RESPONSES TO PHQ9 QUESTIONS 1 AND 2: 0

## 2025-01-30 ASSESSMENT — ENCOUNTER SYMPTOMS: DEPRESSION: 0

## 2025-01-30 NOTE — PROGRESS NOTES
Subjective   Patient ID: Jase Amato is a 89 y.o. male who presents for Consult (Discuss medications and prostate.).  HPI  89-year-old male presents to discuss anxiety.  He would like to restart Lexapro for anxiety.  Review of Systems  Constitutional: no chills, no fever and no night sweats.   Eyes: no blurred vision and no eyesight problems.   ENT: no hearing loss, no nasal congestion, no nasal discharge, no hoarseness and no sore throat.   Cardiovascular: no chest pain, no intermittent leg claudication, no lower extremity edema, no palpitations and no syncope.   Respiratory: no cough, no shortness of breath during exertion, no shortness of breath at rest and no wheezing.   Gastrointestinal: no abdominal pain, no blood in stools, no constipation, no diarrhea, no melena, no nausea, no rectal pain and no vomiting.   Genitourinary: no dysuria, no change in urinary frequency, no urinary hesitancy and no feelings of urinary urgency.   Neurological: no difficulty walking, no headache, no limb weakness, no numbness and no tingling.   Psychiatric:  anxiety, no depression, no anhedonia and no substance use disorders.   Endocrine: no recent weight gain and no recent weight loss.   Hematologic/Lymphatic: no tendency for easy bruising and no swollen glands.  Visit Vitals  /74 (BP Location: Right arm, Patient Position: Sitting, BP Cuff Size: Adult)   Pulse 73        Objective   Physical Exam  Constitutional: Alert and in no acute distress. Well developed, well nourished.   Eyes: Pupils were equal in size, round, reactive to light (PERRL) with normal accommodation and extraocular movements intact (EOMI). Ophthalmoscopic examination: Normal.   Ears, Nose, Mouth, and Throat: External inspection of ears and nose: Normal. Otoscopic examination: Normal. Lips, teeth, and gums: Normal. Oropharynx: Normal.   Neck: No neck mass was observed. Supple. Thyroid not enlarged and there were no palpable thyroid nodules.    Cardiovascular: Heart rate and rhythm were normal, normal S1 and S2, no gallops, no murmurs and no pericardial rub. Carotid pulses: Normal with no bruits. Abdominal aorta: Normal. Pedal pulses: Normal. No peripheral edema.   Pulmonary: No respiratory distress. Clear bilateral breath sounds.   Abdomen: Soft nontender; no abdominal mass palpated. Normal bowel sounds. No organomegaly.   Skin: Normal skin color and pigmentation, normal skin turgor, and no rash.   Psychiatric: Judgment and insight: Intact. Mood and affect: Normal.  Assessment/Plan   Problem List Items Addressed This Visit    None  Visit Diagnoses         Codes    Anxiety    -  Primary F41.9    Relevant Medications    escitalopram (Lexapro) 10 mg tablet

## 2025-02-03 ENCOUNTER — INFUSION (OUTPATIENT)
Dept: HEMATOLOGY/ONCOLOGY | Facility: CLINIC | Age: 89
End: 2025-02-03
Payer: MEDICARE

## 2025-02-03 VITALS
DIASTOLIC BLOOD PRESSURE: 73 MMHG | RESPIRATION RATE: 18 BRPM | TEMPERATURE: 97.2 F | OXYGEN SATURATION: 95 % | SYSTOLIC BLOOD PRESSURE: 129 MMHG | HEART RATE: 59 BPM

## 2025-02-03 DIAGNOSIS — D50.8 OTHER IRON DEFICIENCY ANEMIA: ICD-10-CM

## 2025-02-03 DIAGNOSIS — D50.9 IRON DEFICIENCY ANEMIA, UNSPECIFIED IRON DEFICIENCY ANEMIA TYPE: ICD-10-CM

## 2025-02-03 PROCEDURE — 96365 THER/PROPH/DIAG IV INF INIT: CPT | Mod: INF

## 2025-02-03 PROCEDURE — 2500000004 HC RX 250 GENERAL PHARMACY W/ HCPCS (ALT 636 FOR OP/ED): Mod: JZ,TB | Performed by: INTERNAL MEDICINE

## 2025-02-03 RX ORDER — HEPARIN 100 UNIT/ML
500 SYRINGE INTRAVENOUS AS NEEDED
Status: DISCONTINUED | OUTPATIENT
Start: 2025-02-03 | End: 2025-02-03 | Stop reason: HOSPADM

## 2025-02-03 RX ORDER — FAMOTIDINE 10 MG/ML
20 INJECTION INTRAVENOUS ONCE AS NEEDED
Status: DISCONTINUED | OUTPATIENT
Start: 2025-02-03 | End: 2025-02-03 | Stop reason: HOSPADM

## 2025-02-03 RX ORDER — HEPARIN 100 UNIT/ML
500 SYRINGE INTRAVENOUS AS NEEDED
OUTPATIENT
Start: 2025-02-03

## 2025-02-03 RX ORDER — ALBUTEROL SULFATE 0.83 MG/ML
3 SOLUTION RESPIRATORY (INHALATION) AS NEEDED
Status: DISCONTINUED | OUTPATIENT
Start: 2025-02-03 | End: 2025-02-03 | Stop reason: HOSPADM

## 2025-02-03 RX ORDER — HEPARIN SODIUM,PORCINE/PF 10 UNIT/ML
50 SYRINGE (ML) INTRAVENOUS AS NEEDED
OUTPATIENT
Start: 2025-02-03

## 2025-02-03 RX ORDER — DIPHENHYDRAMINE HYDROCHLORIDE 50 MG/ML
50 INJECTION INTRAMUSCULAR; INTRAVENOUS AS NEEDED
Status: DISCONTINUED | OUTPATIENT
Start: 2025-02-03 | End: 2025-02-03 | Stop reason: HOSPADM

## 2025-02-03 RX ORDER — EPINEPHRINE 0.3 MG/.3ML
0.3 INJECTION SUBCUTANEOUS EVERY 5 MIN PRN
OUTPATIENT
Start: 2025-02-10

## 2025-02-03 RX ORDER — ALBUTEROL SULFATE 0.83 MG/ML
3 SOLUTION RESPIRATORY (INHALATION) AS NEEDED
OUTPATIENT
Start: 2025-02-10

## 2025-02-03 RX ORDER — HEPARIN SODIUM,PORCINE/PF 10 UNIT/ML
50 SYRINGE (ML) INTRAVENOUS AS NEEDED
Status: DISCONTINUED | OUTPATIENT
Start: 2025-02-03 | End: 2025-02-03 | Stop reason: HOSPADM

## 2025-02-03 RX ORDER — FAMOTIDINE 10 MG/ML
20 INJECTION INTRAVENOUS ONCE AS NEEDED
OUTPATIENT
Start: 2025-02-10

## 2025-02-03 RX ORDER — DIPHENHYDRAMINE HYDROCHLORIDE 50 MG/ML
50 INJECTION INTRAMUSCULAR; INTRAVENOUS AS NEEDED
OUTPATIENT
Start: 2025-02-10

## 2025-02-03 RX ORDER — EPINEPHRINE 0.3 MG/.3ML
0.3 INJECTION SUBCUTANEOUS EVERY 5 MIN PRN
Status: DISCONTINUED | OUTPATIENT
Start: 2025-02-03 | End: 2025-02-03 | Stop reason: HOSPADM

## 2025-02-03 RX ADMIN — FERUMOXYTOL 510 MG: 510 INJECTION INTRAVENOUS at 14:13

## 2025-02-03 ASSESSMENT — PAIN SCALES - GENERAL: PAINLEVEL_OUTOF10: 0-NO PAIN

## 2025-02-09 ENCOUNTER — APPOINTMENT (OUTPATIENT)
Dept: CARDIOLOGY | Facility: HOSPITAL | Age: 89
End: 2025-02-09
Payer: MEDICARE

## 2025-02-09 ENCOUNTER — HOSPITAL ENCOUNTER (EMERGENCY)
Facility: HOSPITAL | Age: 89
Discharge: HOME | End: 2025-02-09
Attending: EMERGENCY MEDICINE
Payer: MEDICARE

## 2025-02-09 VITALS
OXYGEN SATURATION: 100 % | HEIGHT: 71 IN | RESPIRATION RATE: 18 BRPM | DIASTOLIC BLOOD PRESSURE: 80 MMHG | HEART RATE: 69 BPM | TEMPERATURE: 97 F | BODY MASS INDEX: 25.9 KG/M2 | SYSTOLIC BLOOD PRESSURE: 167 MMHG | WEIGHT: 185 LBS

## 2025-02-09 DIAGNOSIS — I10 ASYMPTOMATIC HYPERTENSION: Primary | ICD-10-CM

## 2025-02-09 PROCEDURE — 99283 EMERGENCY DEPT VISIT LOW MDM: CPT | Performed by: EMERGENCY MEDICINE

## 2025-02-09 PROCEDURE — 93005 ELECTROCARDIOGRAM TRACING: CPT

## 2025-02-09 ASSESSMENT — PAIN SCALES - GENERAL
PAINLEVEL_OUTOF10: 0 - NO PAIN
PAINLEVEL_OUTOF10: 0 - NO PAIN

## 2025-02-09 ASSESSMENT — LIFESTYLE VARIABLES
EVER HAD A DRINK FIRST THING IN THE MORNING TO STEADY YOUR NERVES TO GET RID OF A HANGOVER: NO
HAVE PEOPLE ANNOYED YOU BY CRITICIZING YOUR DRINKING: NO
TOTAL SCORE: 0
EVER FELT BAD OR GUILTY ABOUT YOUR DRINKING: NO
HAVE YOU EVER FELT YOU SHOULD CUT DOWN ON YOUR DRINKING: NO

## 2025-02-09 ASSESSMENT — COLUMBIA-SUICIDE SEVERITY RATING SCALE - C-SSRS
6. HAVE YOU EVER DONE ANYTHING, STARTED TO DO ANYTHING, OR PREPARED TO DO ANYTHING TO END YOUR LIFE?: NO
2. HAVE YOU ACTUALLY HAD ANY THOUGHTS OF KILLING YOURSELF?: NO
1. IN THE PAST MONTH, HAVE YOU WISHED YOU WERE DEAD OR WISHED YOU COULD GO TO SLEEP AND NOT WAKE UP?: NO

## 2025-02-09 ASSESSMENT — PAIN - FUNCTIONAL ASSESSMENT: PAIN_FUNCTIONAL_ASSESSMENT: 0-10

## 2025-02-09 NOTE — ED PROVIDER NOTES
HPI   Chief Complaint   Patient presents with    Hypertension     PT PRESENTS TO ED FROM HOME VIA PRIVATE AUTO FOR ELEVATED BP AT HOME. PT REPORTS TAKING LISINOPRIL AT HOME. ENDORSES RECENT STRESS. DENIES BLURRY VISION, HEADACHE, NUMBNESS/ TINGLING, DIZZINESS. NIH 0 AT TIME OF TRIAGE.        HPI  Patient is an 89-year-old male presenting to the ED today for high blood pressure.  Patient explains that he has a history of hypertension for which he takes lisinopril in the morning and metoprolol in the evening.  Today, patient states he was worrying about his prostate issues and became anxious about his blood pressure so he checked it.  It was high in the 180s to 190s systolic.  He then took his daily lisinopril and it came down to the 130s.  However, he continued to worry about it and continued to check it throughout the day and it fluctuated anywhere from 130s to low 200s systolic, so son brought him here to the ED for further evaluation.  Patient otherwise denies any symptoms.  He denies any chest pain, shortness of breath, headache, numbness, weakness, tingling, vision changes, gait difficulty, abdominal pain, or urinary symptoms.  Patient tells me that he worries a lot about his prostate but cannot get into see urology until the end of March.  He is continually worrying about whether or not he is going to be able to urinate, but states that he has not had any urinary issues.  Patient admits to having a lot of anxiety about his health.  He states that he has been checking his blood pressure all day today because it keeps bouncing around.      Patient History   Past Medical History:   Diagnosis Date    Chronic systolic (congestive) heart failure     Chronic systolic congestive heart failure    Dilated cardiomyopathy (Multi)     Secondary dilated cardiomyopathy    Dizziness and giddiness     Light headedness    Encounter for follow-up examination after completed treatment for conditions other than malignant neoplasm  08/09/2022    Hospital discharge follow-up    Encounter for preprocedural laboratory examination     Pre-procedure lab exam    Essential (primary) hypertension     Benign essential HTN    Gastrointestinal hemorrhage, unspecified 11/04/2021    Chronic upper gastrointestinal bleeding    Generalized anxiety disorder 08/30/2022    Anxiety, generalized    Impacted cerumen, left ear 08/30/2022    Hearing loss due to cerumen impaction, left    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    Iron deficiency anemia due to chronic blood loss    Personal history of diseases of the blood and blood-forming organs and certain disorders involving the immune mechanism 09/02/2021    History of anemia    Personal history of diseases of the blood and blood-forming organs and certain disorders involving the immune mechanism 05/09/2018    History of iron deficiency anemia    Personal history of malignant neoplasm of prostate     History of malignant neoplasm of prostate    Personal history of other benign neoplasm 11/04/2021    History of other benign neoplasm    Personal history of other diseases of the circulatory system 08/11/2022    History of carotid artery stenosis    Personal history of other diseases of the circulatory system     History of cardiomyopathy    Personal history of other diseases of the circulatory system     Atrial fibrillation, currently in sinus rhythm    Personal history of other diseases of the circulatory system     History of atrial fibrillation    Personal history of other diseases of the nervous system and sense organs 08/30/2022    History of acute otitis externa    Personal history of other endocrine, nutritional and metabolic disease     History of hyperlipidemia    Personal history of other specified conditions 05/18/2022    History of palpitations    Personal history of other specified conditions     History of urinary retention    Unspecified fall, subsequent encounter  01/06/2022    Accidental fall, subsequent encounter     Past Surgical History:   Procedure Laterality Date    MR HEAD ANGIO WO IV CONTRAST  7/28/2022    MR HEAD ANGIO WO IV CONTRAST 7/28/2022 [facility]    MR NECK ANGIO WO IV CONTRAST  7/28/2022    MR NECK ANGIO WO IV CONTRAST 7/28/2022 [facility]    PROSTATE SURGERY  01/25/2018    Prostate Surgery     No family history on file.  Social History     Tobacco Use    Smoking status: Never    Smokeless tobacco: Never   Substance Use Topics    Alcohol use: Never    Drug use: Never       Physical Exam   ED Triage Vitals [02/09/25 1057]   Temperature Heart Rate Respirations BP   36.1 °C (97 °F) 69 18 167/80      Pulse Ox Temp Source Heart Rate Source Patient Position   100 % Temporal Monitor Sitting      BP Location FiO2 (%)     Right arm --       Physical Exam  Vitals and nursing note reviewed.   Constitutional:       General: He is not in acute distress.     Appearance: He is not toxic-appearing.   HENT:      Head: Normocephalic.      Mouth/Throat:      Mouth: Mucous membranes are moist.   Eyes:      Extraocular Movements: Extraocular movements intact.      Conjunctiva/sclera: Conjunctivae normal.   Cardiovascular:      Rate and Rhythm: Normal rate and regular rhythm.      Pulses: Normal pulses.   Pulmonary:      Effort: Pulmonary effort is normal. No respiratory distress.      Breath sounds: Normal breath sounds. No wheezing.   Abdominal:      General: There is no distension.      Palpations: Abdomen is soft.      Tenderness: There is no abdominal tenderness.   Musculoskeletal:         General: No swelling.      Cervical back: Neck supple.   Skin:     General: Skin is warm and dry.      Capillary Refill: Capillary refill takes less than 2 seconds.   Neurological:      General: No focal deficit present.      Mental Status: He is alert. Mental status is at baseline.      Comments: This patient is awake, alert and oriented to person, place and time.  Speech is clear and fluent. Cranial nerves II-XII are grossly intact. Strength and sensation are intact in all extremities. Stable gait.             ED Course & MDM   ED Course as of 02/09/25 1147   Sun Feb 09, 2025   1142 EKG obtained at 1139, interpreted by myself.  Sinus bradycardia with a ventricular rate of 59, no axis deviation, right bundle branch block present, no acute ischemic changes [VT]      ED Course User Index  [VT] Nerissa HEWITT MD         Diagnoses as of 02/09/25 1147   Asymptomatic hypertension             No data recorded     Felda Coma Scale Score: 15 (02/09/25 1057 : Charla Cavazos RN)                         Medical Decision Making  Patient was seen and evaluated for asymptomatic hypertension.  On arrival here to the ED, blood pressure is 167/80.  Vital signs are otherwise unremarkable.  EKG does not show any acute ischemic changes.  As patient is asymptomatic, with no signs of endorgan damage, no additional lab work or imaging is indicated at this time.  Patient does have a follow-up appointment with his cardiologist later this month.  Patient is agreeable with this plan of care and close outpatient follow-up.  All questions and concerns were answered. Strict return precautions were given, and patient was discharged home in stable condition.      Procedure  Procedures     Nerissa HEWITT MD  02/09/25 1214

## 2025-02-10 ENCOUNTER — TELEPHONE (OUTPATIENT)
Dept: PRIMARY CARE | Facility: CLINIC | Age: 89
End: 2025-02-10
Payer: MEDICARE

## 2025-02-10 ENCOUNTER — APPOINTMENT (OUTPATIENT)
Dept: HEMATOLOGY/ONCOLOGY | Facility: CLINIC | Age: 89
End: 2025-02-10
Payer: MEDICARE

## 2025-02-10 LAB
ATRIAL RATE: 59 BPM
P AXIS: 108 DEGREES
P OFFSET: 174 MS
P ONSET: 140 MS
PR INTERVAL: 160 MS
Q ONSET: 220 MS
QRS COUNT: 10 BEATS
QRS DURATION: 146 MS
QT INTERVAL: 468 MS
QTC CALCULATION(BAZETT): 463 MS
QTC FREDERICIA: 465 MS
R AXIS: 19 DEGREES
T AXIS: 25 DEGREES
T OFFSET: 454 MS
VENTRICULAR RATE: 59 BPM

## 2025-02-10 NOTE — TELEPHONE ENCOUNTER
Patient Seen in: [facility]      History   Patient presents with:  Eval-G    Stated Complaint: TL- eval G/tampon stuck    HPI  Patient is 43-year-old female without significant medical history presents with retained tampon since Patient went tot he ED yesterday for his blood pressure being to high and today it is the same and his son was wondering if he should double up on his medication I made him appointment to see you tomorrow     [phone]   Comments: Vaginal speculum exam performed. Retained tampon removed intact. Reexamination of vaginal vault reveals no other retained body. Skin:     General: Skin is warm and dry.    Neurological:      Mental Status: She is oriented to person, place, and

## 2025-02-11 ENCOUNTER — OFFICE VISIT (OUTPATIENT)
Dept: PRIMARY CARE | Facility: CLINIC | Age: 89
End: 2025-02-11
Payer: MEDICARE

## 2025-02-11 VITALS
WEIGHT: 185 LBS | HEART RATE: 101 BPM | DIASTOLIC BLOOD PRESSURE: 88 MMHG | TEMPERATURE: 98.2 F | HEIGHT: 71 IN | OXYGEN SATURATION: 98 % | BODY MASS INDEX: 25.9 KG/M2 | SYSTOLIC BLOOD PRESSURE: 146 MMHG

## 2025-02-11 DIAGNOSIS — I10 ESSENTIAL HYPERTENSION: ICD-10-CM

## 2025-02-11 DIAGNOSIS — I48.0 PAROXYSMAL ATRIAL FIBRILLATION (MULTI): Primary | ICD-10-CM

## 2025-02-11 DIAGNOSIS — E78.00 HYPERCHOLESTEROLEMIA: ICD-10-CM

## 2025-02-11 PROCEDURE — G2211 COMPLEX E/M VISIT ADD ON: HCPCS | Performed by: FAMILY MEDICINE

## 2025-02-11 PROCEDURE — 3077F SYST BP >= 140 MM HG: CPT | Performed by: FAMILY MEDICINE

## 2025-02-11 PROCEDURE — 1160F RVW MEDS BY RX/DR IN RCRD: CPT | Performed by: FAMILY MEDICINE

## 2025-02-11 PROCEDURE — 99214 OFFICE O/P EST MOD 30 MIN: CPT | Performed by: FAMILY MEDICINE

## 2025-02-11 PROCEDURE — 1159F MED LIST DOCD IN RCRD: CPT | Performed by: FAMILY MEDICINE

## 2025-02-11 PROCEDURE — 1157F ADVNC CARE PLAN IN RCRD: CPT | Performed by: FAMILY MEDICINE

## 2025-02-11 PROCEDURE — 1126F AMNT PAIN NOTED NONE PRSNT: CPT | Performed by: FAMILY MEDICINE

## 2025-02-11 PROCEDURE — 1036F TOBACCO NON-USER: CPT | Performed by: FAMILY MEDICINE

## 2025-02-11 PROCEDURE — 3078F DIAST BP <80 MM HG: CPT | Performed by: FAMILY MEDICINE

## 2025-02-11 RX ORDER — LISINOPRIL 5 MG/1
5 TABLET ORAL DAILY
Qty: 90 TABLET | Refills: 3 | Status: SHIPPED | OUTPATIENT
Start: 2025-02-11 | End: 2025-02-11 | Stop reason: SDUPTHER

## 2025-02-11 RX ORDER — LISINOPRIL 5 MG/1
5 TABLET ORAL DAILY
Qty: 90 TABLET | Refills: 3 | Status: SHIPPED | OUTPATIENT
Start: 2025-02-11 | End: 2026-02-11

## 2025-02-11 ASSESSMENT — PAIN SCALES - GENERAL: PAINLEVEL_OUTOF10: 0-NO PAIN

## 2025-02-11 NOTE — PROGRESS NOTES
Subjective   Patient ID: Jase Amato is a 89 y.o. male who presents for Follow-up (Patient is here for follow up from ED for high blood pressure. ).  HPI  89-year-old male with history of atrial fibrillation hyperlipidemia for ED follow-up due to elevated blood pressures.  Patient has been more anxious.  Concerns regarding family health issues.  ED workup was unremarkable.  Review of Systems  Constitutional: no chills, no fever and no night sweats.   Eyes: no blurred vision and no eyesight problems.   ENT: no hearing loss, no nasal congestion, no nasal discharge, no hoarseness and no sore throat.   Cardiovascular: no chest pain, no intermittent leg claudication, no lower extremity edema, no palpitations and no syncope.   Respiratory: no cough, no shortness of breath during exertion, no shortness of breath at rest and no wheezing.   Gastrointestinal: no abdominal pain, no blood in stools, no constipation, no diarrhea, no melena, no nausea, no rectal pain and no vomiting.   Genitourinary: no dysuria, no change in urinary frequency, no urinary hesitancy and no feelings of urinary urgency.   Neurological: no difficulty walking, no headache, no limb weakness, no numbness and no tingling.   Psychiatric: no anxiety, no depression, no anhedonia and no substance use disorders.   Endocrine: no recent weight gain and no recent weight loss.   Hematologic/Lymphatic: no tendency for easy bruising and no swollen glands.  Visit Vitals  /88   Pulse 101   Temp 36.8 °C (98.2 °F) (Temporal)        Objective   Physical Exam  Constitutional: Alert and in no acute distress. Well developed, well nourished.   Eyes: Pupils were equal in size, round, reactive to light (PERRL) with normal accommodation and extraocular movements intact (EOMI). Ophthalmoscopic examination: Normal.   Ears, Nose, Mouth, and Throat: External inspection of ears and nose: Normal. Otoscopic examination: Normal. Lips, teeth, and gums: Normal. Oropharynx:  Normal.   Neck: No neck mass was observed. Supple. Thyroid not enlarged and there were no palpable thyroid nodules.   Cardiovascular: Heart rate and rhythm were normal, normal S1 and S2, no gallops, no murmurs and no pericardial rub. Carotid pulses: Normal with no bruits. Abdominal aorta: Normal. Pedal pulses: Normal. No peripheral edema.   Pulmonary: No respiratory distress. Clear bilateral breath sounds.   Abdomen: Soft nontender; no abdominal mass palpated. Normal bowel sounds. No organomegaly.   Skin: Normal skin color and pigmentation, normal skin turgor, and no rash.   Psychiatric: Judgment and insight: Intact. Mood and affect: Normal.  Assessment/Plan   Problem List Items Addressed This Visit             ICD-10-CM    Hypercholesterolemia E78.00    Paroxysmal atrial fibrillation (Multi) - Primary I48.0    Essential hypertension I10    Relevant Medications    lisinopril 5 mg tablet     #1.  We discussed only checking blood pressure when he feels relaxed.  No more than once a day.  Increase lisinopril to 5 mg daily.  Follow-up with cardiology in 2 weeks.  Follow-up with our office in 1 month.  Call with any concerns regarding blood pressure.

## 2025-02-19 ENCOUNTER — TELEPHONE (OUTPATIENT)
Dept: CARDIOLOGY | Facility: CLINIC | Age: 89
End: 2025-02-19
Payer: MEDICARE

## 2025-02-19 DIAGNOSIS — I10 ESSENTIAL HYPERTENSION: ICD-10-CM

## 2025-02-19 RX ORDER — LISINOPRIL 10 MG/1
10 TABLET ORAL DAILY
COMMUNITY
Start: 2025-02-19

## 2025-02-19 NOTE — TELEPHONE ENCOUNTER
Spoke to patient's son - states blood pressure was 190s this AM. PCP increased lisinopril to 5mg daily  on 2/11/25 after ER visit. Per patient's son, pt denies SOB and does not have edema. Per patient's son, patient has been anxious about upcoming urology appt. Reviewed with Dr. Gutiérrez: Per Dr. Gutiérrez increase lisinopril to 10mg daily. Spoke to patient's son and made aware. Appt rescheduled 2/25. Instructed to contact office with any further questions/concerns.

## 2025-02-19 NOTE — TELEPHONE ENCOUNTER
Paulie called to see if he could get his Dad in sooner. His BP has been high , Primary doubled his Lisinapril on 2/11 and it is still high. His watch is saying he is in AFIB. Also says his face is tight and his legs. Just doesn't feel right. Paulie can be reached at:    [phone]

## 2025-02-21 ENCOUNTER — INFUSION (OUTPATIENT)
Dept: HEMATOLOGY/ONCOLOGY | Facility: CLINIC | Age: 89
End: 2025-02-21
Payer: MEDICARE

## 2025-02-21 VITALS
HEART RATE: 56 BPM | RESPIRATION RATE: 18 BRPM | TEMPERATURE: 97.5 F | DIASTOLIC BLOOD PRESSURE: 91 MMHG | SYSTOLIC BLOOD PRESSURE: 186 MMHG | OXYGEN SATURATION: 100 %

## 2025-02-21 DIAGNOSIS — D50.8 OTHER IRON DEFICIENCY ANEMIA: ICD-10-CM

## 2025-02-21 DIAGNOSIS — D50.9 IRON DEFICIENCY ANEMIA, UNSPECIFIED IRON DEFICIENCY ANEMIA TYPE: ICD-10-CM

## 2025-02-21 PROCEDURE — 2500000004 HC RX 250 GENERAL PHARMACY W/ HCPCS (ALT 636 FOR OP/ED): Mod: JZ,TB | Performed by: INTERNAL MEDICINE

## 2025-02-21 PROCEDURE — 96365 THER/PROPH/DIAG IV INF INIT: CPT | Mod: INF

## 2025-02-21 RX ORDER — EPINEPHRINE 0.3 MG/.3ML
0.3 INJECTION SUBCUTANEOUS EVERY 5 MIN PRN
OUTPATIENT
Start: 2025-02-24

## 2025-02-21 RX ORDER — DIPHENHYDRAMINE HYDROCHLORIDE 50 MG/ML
50 INJECTION INTRAMUSCULAR; INTRAVENOUS AS NEEDED
OUTPATIENT
Start: 2025-02-24

## 2025-02-21 RX ORDER — HEPARIN 100 UNIT/ML
500 SYRINGE INTRAVENOUS AS NEEDED
OUTPATIENT
Start: 2025-02-21

## 2025-02-21 RX ORDER — FAMOTIDINE 10 MG/ML
20 INJECTION, SOLUTION INTRAVENOUS ONCE AS NEEDED
OUTPATIENT
Start: 2025-02-24

## 2025-02-21 RX ORDER — ALBUTEROL SULFATE 0.83 MG/ML
3 SOLUTION RESPIRATORY (INHALATION) AS NEEDED
OUTPATIENT
Start: 2025-02-24

## 2025-02-21 RX ORDER — HEPARIN SODIUM,PORCINE/PF 10 UNIT/ML
50 SYRINGE (ML) INTRAVENOUS AS NEEDED
OUTPATIENT
Start: 2025-02-21

## 2025-02-21 RX ADMIN — FERUMOXYTOL 510 MG: 510 INJECTION INTRAVENOUS at 14:25

## 2025-02-21 ASSESSMENT — PAIN SCALES - GENERAL: PAINLEVEL_OUTOF10: 0-NO PAIN

## 2025-02-24 ENCOUNTER — APPOINTMENT (OUTPATIENT)
Dept: UROLOGY | Facility: CLINIC | Age: 89
End: 2025-02-24
Payer: MEDICARE

## 2025-02-24 VITALS — DIASTOLIC BLOOD PRESSURE: 79 MMHG | SYSTOLIC BLOOD PRESSURE: 148 MMHG | TEMPERATURE: 97.5 F | HEART RATE: 60 BPM

## 2025-02-24 DIAGNOSIS — R35.1 NOCTURIA: ICD-10-CM

## 2025-02-24 DIAGNOSIS — N47.1 PHIMOSIS: ICD-10-CM

## 2025-02-24 LAB
POC APPEARANCE, URINE: CLEAR
POC BILIRUBIN, URINE: NEGATIVE
POC BLOOD, URINE: ABNORMAL
POC COLOR, URINE: YELLOW
POC GLUCOSE, URINE: NEGATIVE MG/DL
POC KETONES, URINE: NEGATIVE MG/DL
POC LEUKOCYTES, URINE: ABNORMAL
POC NITRITE,URINE: NEGATIVE
POC PH, URINE: 6 PH
POC PROTEIN, URINE: NEGATIVE MG/DL
POC SPECIFIC GRAVITY, URINE: 1.01
POC UROBILINOGEN, URINE: 0.2 EU/DL

## 2025-02-24 PROCEDURE — 81003 URINALYSIS AUTO W/O SCOPE: CPT | Performed by: NURSE PRACTITIONER

## 2025-02-24 PROCEDURE — 99204 OFFICE O/P NEW MOD 45 MIN: CPT | Performed by: NURSE PRACTITIONER

## 2025-02-24 PROCEDURE — 3078F DIAST BP <80 MM HG: CPT | Performed by: NURSE PRACTITIONER

## 2025-02-24 PROCEDURE — G2211 COMPLEX E/M VISIT ADD ON: HCPCS | Performed by: NURSE PRACTITIONER

## 2025-02-24 PROCEDURE — 1157F ADVNC CARE PLAN IN RCRD: CPT | Performed by: NURSE PRACTITIONER

## 2025-02-24 PROCEDURE — 1159F MED LIST DOCD IN RCRD: CPT | Performed by: NURSE PRACTITIONER

## 2025-02-24 PROCEDURE — 3077F SYST BP >= 140 MM HG: CPT | Performed by: NURSE PRACTITIONER

## 2025-02-24 NOTE — PROGRESS NOTES
Subjective   Patient ID: Jase Amato is a 89 y.o. male who presents for No chief complaint on file..  Patient presents for evaluation of severe phimosis. History of prostate CA in 2015 s/p EBRT, Afib on Xarelto, as well as HTN and hyperlipidemia.     Minimal LUTS. Feels that he empties bladder well. Denies dysuria and gross hematuria.     , lives with son.           Review of Systems   All other systems reviewed and are negative.      Objective   Physical Exam  Vitals reviewed.   Genitourinary:     Penis: Uncircumcised. Phimosis present.      Constitutional: Patient generally appears stated age. Good nutrition. No deformities. Good attention to grooming.  Neck: No neck masses. Good symmetry. No crepitus. Normal thyroid size and consistency with no masses.  Respiratory: Normal respiratory effort. No intercostal retractions. No use of accessory muscles.  Cardiovascular: Examination of the peripheral vascular system reveals no swelling or varicosities with good pulses. Normal extremity temperature, no edema or tenderness.  Abdomen: Examination of the abdomen reveals no masses or tenderness. No hernias detected. Normal liver and spleen size.   Lymphatic: No palpable lymph nodes in the neck, axilla, groin or other locations  Skin: Inspection of the skin reveals no rashes, lesions, ulcers.  Neurologic/Psychiatric: Oriented to time, place and person. Normal mood and affect with no depression, anxiety, or agitation.    Office Visit on 02/24/2025   Component Date Value Ref Range Status    POC Color, Urine 02/24/2025 Yellow  Straw, Yellow, Light-Yellow Final    POC Appearance, Urine 02/24/2025 Clear  Clear Final    POC Glucose, Urine 02/24/2025 NEGATIVE  NEGATIVE mg/dl Final    POC Bilirubin, Urine 02/24/2025 NEGATIVE  NEGATIVE Final    POC Ketones, Urine 02/24/2025 NEGATIVE  NEGATIVE mg/dl Final    POC Specific Gravity, Urine 02/24/2025 1.010  1.005 - 1.035 Final    POC Blood, Urine 02/24/2025 TRACE-Intact  (A)  NEGATIVE Final    POC PH, Urine 02/24/2025 6.0  No Reference Range Established PH Final    POC Protein, Urine 02/24/2025 NEGATIVE  NEGATIVE mg/dl Final    POC Urobilinogen, Urine 02/24/2025 0.2  0.2, 1.0 EU/DL Final    Poc Nitrite, Urine 02/24/2025 NEGATIVE  NEGATIVE Final    POC Leukocytes, Urine 02/24/2025 TRACE (A)  NEGATIVE Final     PVR=35ml    Assessment/Plan   Diagnoses and all orders for this visit:  Phimosis  -     Referral to Urology  Nocturia  -     Post-Void Residual  -     POCT UA Automated manually resulted    Severe phimosis noted on exam. Very interested in pursuing circumcision. Would like to have this done at [place]. Will contact Dr. Connors to arrange. Patient and his son are in agreement with plan.          Joelle Grady, RUSTY-CNP 02/24/25 9:05 AM

## 2025-02-25 ENCOUNTER — OFFICE VISIT (OUTPATIENT)
Dept: CARDIOLOGY | Facility: CLINIC | Age: 89
End: 2025-02-25
Payer: MEDICARE

## 2025-02-25 VITALS
HEIGHT: 71 IN | WEIGHT: 183 LBS | DIASTOLIC BLOOD PRESSURE: 80 MMHG | SYSTOLIC BLOOD PRESSURE: 150 MMHG | HEART RATE: 70 BPM | BODY MASS INDEX: 25.62 KG/M2 | OXYGEN SATURATION: 99 %

## 2025-02-25 DIAGNOSIS — I42.9 CARDIOMYOPATHY, UNSPECIFIED TYPE (MULTI): ICD-10-CM

## 2025-02-25 DIAGNOSIS — E78.00 HYPERCHOLESTEROLEMIA: ICD-10-CM

## 2025-02-25 DIAGNOSIS — C61 PROSTATIC CANCER (MULTI): ICD-10-CM

## 2025-02-25 DIAGNOSIS — I48.0 PAROXYSMAL ATRIAL FIBRILLATION (MULTI): Primary | ICD-10-CM

## 2025-02-25 DIAGNOSIS — I50.30 NYHA CLASS 2 HEART FAILURE WITH PRESERVED EJECTION FRACTION: ICD-10-CM

## 2025-02-25 DIAGNOSIS — I10 ESSENTIAL HYPERTENSION: ICD-10-CM

## 2025-02-25 DIAGNOSIS — I71.40 ABDOMINAL AORTIC ANEURYSM (AAA) WITHOUT RUPTURE, UNSPECIFIED PART (CMS-HCC): ICD-10-CM

## 2025-02-25 DIAGNOSIS — I50.32 CHRONIC DIASTOLIC CONGESTIVE HEART FAILURE: ICD-10-CM

## 2025-02-25 PROCEDURE — 1157F ADVNC CARE PLAN IN RCRD: CPT | Performed by: INTERNAL MEDICINE

## 2025-02-25 PROCEDURE — 99214 OFFICE O/P EST MOD 30 MIN: CPT | Performed by: INTERNAL MEDICINE

## 2025-02-25 PROCEDURE — 1160F RVW MEDS BY RX/DR IN RCRD: CPT | Performed by: INTERNAL MEDICINE

## 2025-02-25 PROCEDURE — 93005 ELECTROCARDIOGRAM TRACING: CPT | Performed by: INTERNAL MEDICINE

## 2025-02-25 PROCEDURE — 1159F MED LIST DOCD IN RCRD: CPT | Performed by: INTERNAL MEDICINE

## 2025-02-25 PROCEDURE — 3079F DIAST BP 80-89 MM HG: CPT | Performed by: INTERNAL MEDICINE

## 2025-02-25 PROCEDURE — 3077F SYST BP >= 140 MM HG: CPT | Performed by: INTERNAL MEDICINE

## 2025-02-25 NOTE — PROGRESS NOTES
"  Subjective  Jase Amato  is a 89 y.o. year old male who presents for paroxysmal atrial fibrillation.  Seen [facility] ER 2/9/25 for high blood pressure.  Improved on lisinopril to 10 mg.  No chest pain, no palpitations, no dyspnea, no edema.  He is worried his penis is shrinking to have a procedure for enlargement    Blood pressure 150/80, pulse 70, height 1.803 m (5' 11\"), weight 83 kg (183 lb), SpO2 99%.   Patient has no known allergies.  Past Medical History:   Diagnosis Date    Chronic systolic (congestive) heart failure     Chronic systolic congestive heart failure    Dilated cardiomyopathy (Multi)     Secondary dilated cardiomyopathy    Dizziness and giddiness     Light headedness    Encounter for follow-up examination after completed treatment for conditions other than malignant neoplasm 08/09/2022    Hospital discharge follow-up    Encounter for preprocedural laboratory examination     Pre-procedure lab exam    Essential (primary) hypertension     Benign essential HTN    Gastrointestinal hemorrhage, unspecified 11/04/2021    Chronic upper gastrointestinal bleeding    Generalized anxiety disorder 08/30/2022    Anxiety, generalized    Impacted cerumen, left ear 08/30/2022    Hearing loss due to cerumen impaction, left    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    Iron deficiency anemia due to chronic blood loss    Personal history of diseases of the blood and blood-forming organs and certain disorders involving the immune mechanism 09/02/2021    History of anemia    Personal history of diseases of the blood and blood-forming organs and certain disorders involving the immune mechanism 05/09/2018    History of iron deficiency anemia    Personal history of malignant neoplasm of prostate     History of malignant neoplasm of prostate    Personal history of other benign neoplasm 11/04/2021    History of other benign neoplasm    Personal history of other diseases of the circulatory " system 08/11/2022    History of carotid artery stenosis    Personal history of other diseases of the circulatory system     History of cardiomyopathy    Personal history of other diseases of the circulatory system     Atrial fibrillation, currently in sinus rhythm    Personal history of other diseases of the circulatory system     History of atrial fibrillation    Personal history of other diseases of the nervous system and sense organs 08/30/2022    History of acute otitis externa    Personal history of other endocrine, nutritional and metabolic disease     History of hyperlipidemia    Personal history of other specified conditions 05/18/2022    History of palpitations    Personal history of other specified conditions     History of urinary retention    Unspecified fall, subsequent encounter 01/06/2022    Accidental fall, subsequent encounter     Past Surgical History:   Procedure Laterality Date    MR HEAD ANGIO WO IV CONTRAST  7/28/2022    MR HEAD ANGIO WO IV CONTRAST 7/28/2022 PAR [facility]ENCY [facility]GAC[facility]    MR NECK ANGIO WO IV CONTRAST  7/28/2022    MR NECK ANGIO WO IV CONTRAST 7/28/2022 PAR [facility]    PROSTATE SURGERY  01/25/2018    Prostate Surgery     No family history on file.  @[facility]    Current Outpatient Medications   Medication Sig Dispense Refill    atorvastatin (Lipitor) 40 mg tablet TAKE 1 TABLET BY MOUTH DAILY 90 tablet 3    escitalopram (Lexapro) 10 mg tablet Take 1 tablet (10 mg) by mouth once daily. 90 tablet 3    furosemide (Lasix) 40 mg tablet TAKE 1 TABLET BY MOUTH 3 TIMES  WEEKLY 39 tablet 3    latanoprost (Xalatan) 0.005 % ophthalmic solution INSTILL 1 DROP IN RIGHT EYE EVERY EVENING      lisinopril 10 mg tablet Take 1 tablet (10 mg) by mouth once daily.      metoprolol succinate XL (Toprol-XL) 50 mg 24 hr tablet TAKE 1 TABLET BY MOUTH DAILY 90 tablet 3    Xarelto 20 mg tablet TAKE 1 TABLET BY MOUTH DAILY 90 tablet 3     No current facility-administered medications for this visit.         ROS  Review of Systems   All other systems reviewed and are negative.      Physical Exam  Physical Exam  Constitutional:       Appearance: Normal appearance.   HENT:      Head: Normocephalic and atraumatic.   Cardiovascular:      Rate and Rhythm: Normal rate and regular rhythm.      Comments: S2 widely split  Pulmonary:      Effort: Pulmonary effort is normal.      Breath sounds: Normal breath sounds.   Abdominal:      General: Abdomen is flat.   Musculoskeletal:      Right lower leg: No edema.      Left lower leg: No edema.   Skin:     General: Skin is warm and dry.   Neurological:      General: No focal deficit present.      Mental Status: He is alert.   Psychiatric:         Mood and Affect: Mood normal.         Behavior: Behavior normal.          EKG  Encounter Date: 02/09/25   ECG 12 lead   Result Value    Ventricular Rate 59    Atrial Rate 59    AR Interval 160    QRS Duration 146    QT Interval 468    QTC Calculation(Bazett) 463    P Axis 108    R Axis 19    T Axis 25    QRS Count 10    Q Onset 220    P Onset 140    P Offset 174    T Offset 454    QTC Fredericia 465    Narrative    Sinus bradycardia  Right bundle branch block  Abnormal ECG  When compared with ECG of 16-MAY-2023 13:58,  PREVIOUS ECG IS PRESENT  See ED provider note for full interpretation and clinical correlation  Confirmed by Apryl Ortiz ([id number]) on 2/27/2025 2:15:59 PM       Problem List Items Addressed This Visit       AAA (abdominal aortic aneurysm) (CMS-HCC)    Chronic diastolic congestive heart failure    Relevant Orders    Follow Up In Cardiology    RESOLVED: Hypercholesterolemia    Paroxysmal atrial fibrillation (Multi) - Primary    Relevant Orders    Follow Up In Cardiology    Prostatic cancer (Multi)    NYHA class 2 heart failure with preserved ejection fraction     7/29/22 echocardiogram LVEF = 65-70% with mild to moderate aortic insufficiency         Essential hypertension    Cardiomyopathy     Tachycardia related  resolved 4/20/15              Same meds  Return 3 months with EKG      Mike Gutiérrez MD

## 2025-02-26 LAB
APPEARANCE UR: CLEAR
BACTERIA #/AREA URNS HPF: ABNORMAL /HPF
BACTERIA UR CULT: ABNORMAL
BILIRUB UR QL STRIP: NEGATIVE
COLOR UR: YELLOW
GLUCOSE UR QL STRIP: NEGATIVE
HGB UR QL STRIP: NEGATIVE
HYALINE CASTS #/AREA URNS LPF: ABNORMAL /LPF
KETONES UR QL STRIP: NEGATIVE
LEUKOCYTE ESTERASE UR QL STRIP: ABNORMAL
NITRITE UR QL STRIP: NEGATIVE
PH UR STRIP: 8.5 [PH] (ref 5–8)
PROT UR QL STRIP: NEGATIVE
RBC #/AREA URNS HPF: ABNORMAL /HPF
SERVICE CMNT-IMP: ABNORMAL
SP GR UR STRIP: 1.01 (ref 1–1.03)
SQUAMOUS #/AREA URNS HPF: ABNORMAL /HPF
WBC #/AREA URNS HPF: ABNORMAL /HPF

## 2025-02-27 ENCOUNTER — APPOINTMENT (OUTPATIENT)
Dept: CARDIOLOGY | Facility: CLINIC | Age: 89
End: 2025-02-27
Payer: MEDICARE

## 2025-02-28 PROBLEM — E78.00 HYPERCHOLESTEROLEMIA: Status: RESOLVED | Noted: 2023-05-18 | Resolved: 2025-02-28

## 2025-03-05 ENCOUNTER — APPOINTMENT (OUTPATIENT)
Facility: CLINIC | Age: 89
End: 2025-03-05
Payer: MEDICARE

## 2025-03-05 VITALS
WEIGHT: 187.8 LBS | TEMPERATURE: 97.6 F | HEART RATE: 60 BPM | DIASTOLIC BLOOD PRESSURE: 69 MMHG | SYSTOLIC BLOOD PRESSURE: 157 MMHG | HEIGHT: 71 IN | BODY MASS INDEX: 26.29 KG/M2

## 2025-03-05 DIAGNOSIS — Z91.89: ICD-10-CM

## 2025-03-05 DIAGNOSIS — N47.1 PHIMOSIS OF PENIS: Primary | ICD-10-CM

## 2025-03-05 DIAGNOSIS — N99.61: ICD-10-CM

## 2025-03-05 PROCEDURE — 1036F TOBACCO NON-USER: CPT | Performed by: STUDENT IN AN ORGANIZED HEALTH CARE EDUCATION/TRAINING PROGRAM

## 2025-03-05 PROCEDURE — 3077F SYST BP >= 140 MM HG: CPT | Performed by: STUDENT IN AN ORGANIZED HEALTH CARE EDUCATION/TRAINING PROGRAM

## 2025-03-05 PROCEDURE — 1157F ADVNC CARE PLAN IN RCRD: CPT | Performed by: STUDENT IN AN ORGANIZED HEALTH CARE EDUCATION/TRAINING PROGRAM

## 2025-03-05 PROCEDURE — 3078F DIAST BP <80 MM HG: CPT | Performed by: STUDENT IN AN ORGANIZED HEALTH CARE EDUCATION/TRAINING PROGRAM

## 2025-03-05 PROCEDURE — 1160F RVW MEDS BY RX/DR IN RCRD: CPT | Performed by: STUDENT IN AN ORGANIZED HEALTH CARE EDUCATION/TRAINING PROGRAM

## 2025-03-05 PROCEDURE — 1159F MED LIST DOCD IN RCRD: CPT | Performed by: STUDENT IN AN ORGANIZED HEALTH CARE EDUCATION/TRAINING PROGRAM

## 2025-03-05 PROCEDURE — 99204 OFFICE O/P NEW MOD 45 MIN: CPT | Performed by: STUDENT IN AN ORGANIZED HEALTH CARE EDUCATION/TRAINING PROGRAM

## 2025-03-05 RX ORDER — CEFAZOLIN SODIUM 2 G/100ML
2 INJECTION, SOLUTION INTRAVENOUS ONCE
OUTPATIENT
Start: 2025-03-05 | End: 2025-03-05

## 2025-03-05 RX ORDER — CHLORHEXIDINE GLUCONATE 40 MG/ML
SOLUTION TOPICAL DAILY PRN
OUTPATIENT
Start: 2025-03-05

## 2025-03-05 NOTE — PROGRESS NOTES
Chief complaint:  Severe Phimosis  Referring physician:  No ref. provider found     SUBJECTIVE:  HPI:  Jase Amato is a 89 y.o. male with a history of HTN, afib on Xarelto, NYHA class 2 HFpEF on furosemide 40mg TID, anxiety, prostate cancer s/p XRT >10y ago, BPH s/p green light >10y ago who presents for initial evaluation of phimosis.  Here with his son.    Has significant volume nocturia, largely related to furosemide timing.  Concerned that foreskin not retractable, irregular stream, inability to adequately perform routine hygiene.  Some concern that trapping of glans by phimosis is causing penile length loss.    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), Personal history of diseases of the blood and blood-forming organs and certain disorders involving the immune mechanism (09/02/2021), Personal history of diseases of the blood and blood-forming organs and certain disorders involving the immune mechanism (05/09/2018), Personal history of malignant neoplasm of prostate, Personal history of other benign neoplasm (11/04/2021), Personal history of other diseases of the circulatory system (08/11/2022), Personal history of other diseases of the circulatory system, Personal history of other diseases of the circulatory system, Personal history of other diseases of the circulatory system, Personal history of other diseases of the nervous system and sense organs (08/30/2022), Personal history of  other endocrine, nutritional and metabolic disease, Personal history of other specified conditions (05/18/2022), Personal history of other specified conditions, and Unspecified fall, subsequent encounter (01/06/2022).   Surgical history:   has a past surgical history that includes Prostate surgery (01/25/2018); MR angio head wo IV contrast (7/28/2022); and MR angio neck wo IV contrast (7/28/2022).  Family history:  family history is not on file.  Social history:   reports that he has never smoked. He has never used smokeless tobacco. He reports that he does not drink alcohol and does not use drugs.    Medications:    Current Outpatient Medications   Medication Instructions    atorvastatin (LIPITOR) 40 mg, oral, Daily    escitalopram (LEXAPRO) 10 mg, oral, Daily    furosemide (LASIX) 40 mg, oral, 3 times weekly    latanoprost (Xalatan) 0.005 % ophthalmic solution INSTILL 1 DROP IN RIGHT EYE EVERY EVENING    lisinopril 10 mg, Daily    metoprolol succinate XL (TOPROL-XL) 50 mg, oral, Daily    Xarelto 20 mg, oral, Daily      Allergies:    No Known Allergies     ROS:  14-point review of systems negative except as noted above.    OBJECTIVE:  Visit Vitals  /69   Pulse 60   Temp 36.4 °C (97.6 °F)   Body mass index is 26.19 kg/m².    Physical exam  General:  No acute distress  HEENT:  EOMI  CV:  Regular rate  Pulm:  Nonlabored respirations  Abd:  Soft, non-distended  :  uncircumcised phallus, with difficulty able to retract foreskin to visualize orthotopic patent meatus, unable to fully retract to visualize coronal sulcus, bl descended testes with mild atrophy  MSK:  No contractures  Neuro:  Motor intact  Psych:  Appropriate affect    Labs:    Lab Results   Component Value Date    WBC 5.9 01/21/2025    HGB 11.5 (L) 01/21/2025    HCT 35.3 (L) 01/21/2025     01/21/2025    ALT 14 01/21/2025    AST 15 01/21/2025     (L) 01/21/2025    K 4.1 01/21/2025     01/21/2025    CREATININE 1.18 01/21/2025    BUN  "23 01/21/2025    CO2 27 01/21/2025    INR 1.2 (H) 05/16/2023    HGBA1C 5.9 (A) 07/29/2022     REFLEXIVE URINE CULTURE (no units)   Date Value   02/24/2025      Comment:     NO CULTURE INDICATED      Lab Results   Component Value Date    PSA 0.12 03/15/2024       Imaging:  All imaging discussed in HPI was independently reviewed.    ASSESSMENT:  Phimosis - inability to adequately retract foreskin, concern re urinary sx and potential infections, desirous of circumcision under general anesthesia.    Extensively discussed risks of general anesthesia, bleeding, infection, injury.  Discussed alternatives of observation/medical management, dorsal slit.  Patient insistent that he would like circumcision under general anesthesia stating he \"wants to get this done.\"    PLAN:  Circumcision at [place] [facility] OR 3/27  Hold Xarelto 3d pre-op    Follow-up 2 weeks post op wound check    José Connors MD    Problem List Items Addressed This Visit       Phimosis of penis - Primary    Relevant Orders    Case Request Operating Room: CIRCUMCISION (Completed)    Request for Pre-Admission Testing Visit    Coagulation Screen    Basic Metabolic Panel    CBC     Other Visit Diagnoses       At high risk for hemorrhage after procedure        Relevant Orders    Coagulation Screen    Intraop hemor/hemtom of a  sys org comp a  sys procedure        Relevant Orders    Coagulation Screen    CBC             "

## 2025-03-05 NOTE — H&P (VIEW-ONLY)
Chief complaint:  Severe Phimosis  Referring physician:  No ref. provider found     SUBJECTIVE:  HPI:  Jase Amato is a 89 y.o. male with a history of HTN, afib on Xarelto, NYHA class 2 HFpEF on furosemide 40mg TID, anxiety, prostate cancer s/p XRT >10y ago, BPH s/p green light >10y ago who presents for initial evaluation of phimosis.  Here with his son.    Has significant volume nocturia, largely related to furosemide timing.  Concerned that foreskin not retractable, irregular stream, inability to adequately perform routine hygiene.  Some concern that trapping of glans by phimosis is causing penile length loss.    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), Personal history of diseases of the blood and blood-forming organs and certain disorders involving the immune mechanism (09/02/2021), Personal history of diseases of the blood and blood-forming organs and certain disorders involving the immune mechanism (05/09/2018), Personal history of malignant neoplasm of prostate, Personal history of other benign neoplasm (11/04/2021), Personal history of other diseases of the circulatory system (08/11/2022), Personal history of other diseases of the circulatory system, Personal history of other diseases of the circulatory system, Personal history of other diseases of the circulatory system, Personal history of other diseases of the nervous system and sense organs (08/30/2022), Personal history of  other endocrine, nutritional and metabolic disease, Personal history of other specified conditions (05/18/2022), Personal history of other specified conditions, and Unspecified fall, subsequent encounter (01/06/2022).   Surgical history:   has a past surgical history that includes Prostate surgery (01/25/2018); MR angio head wo IV contrast (7/28/2022); and MR angio neck wo IV contrast (7/28/2022).  Family history:  family history is not on file.  Social history:   reports that he has never smoked. He has never used smokeless tobacco. He reports that he does not drink alcohol and does not use drugs.    Medications:    Current Outpatient Medications   Medication Instructions    atorvastatin (LIPITOR) 40 mg, oral, Daily    escitalopram (LEXAPRO) 10 mg, oral, Daily    furosemide (LASIX) 40 mg, oral, 3 times weekly    latanoprost (Xalatan) 0.005 % ophthalmic solution INSTILL 1 DROP IN RIGHT EYE EVERY EVENING    lisinopril 10 mg, Daily    metoprolol succinate XL (TOPROL-XL) 50 mg, oral, Daily    Xarelto 20 mg, oral, Daily      Allergies:    No Known Allergies     ROS:  14-point review of systems negative except as noted above.    OBJECTIVE:  Visit Vitals  /69   Pulse 60   Temp 36.4 °C (97.6 °F)   Body mass index is 26.19 kg/m².    Physical exam  General:  No acute distress  HEENT:  EOMI  CV:  Regular rate  Pulm:  Nonlabored respirations  Abd:  Soft, non-distended  :  uncircumcised phallus, with difficulty able to retract foreskin to visualize orthotopic patent meatus, unable to fully retract to visualize coronal sulcus, bl descended testes with mild atrophy  MSK:  No contractures  Neuro:  Motor intact  Psych:  Appropriate affect    Labs:    Lab Results   Component Value Date    WBC 5.9 01/21/2025    HGB 11.5 (L) 01/21/2025    HCT 35.3 (L) 01/21/2025     01/21/2025    ALT 14 01/21/2025    AST 15 01/21/2025     (L) 01/21/2025    K 4.1 01/21/2025     01/21/2025    CREATININE 1.18 01/21/2025    BUN  "23 01/21/2025    CO2 27 01/21/2025    INR 1.2 (H) 05/16/2023    HGBA1C 5.9 (A) 07/29/2022     REFLEXIVE URINE CULTURE (no units)   Date Value   02/24/2025      Comment:     NO CULTURE INDICATED      Lab Results   Component Value Date    PSA 0.12 03/15/2024       Imaging:  All imaging discussed in HPI was independently reviewed.    ASSESSMENT:  Phimosis - inability to adequately retract foreskin, concern re urinary sx and potential infections, desirous of circumcision under general anesthesia.    Extensively discussed risks of general anesthesia, bleeding, infection, injury.  Discussed alternatives of observation/medical management, dorsal slit.  Patient insistent that he would like circumcision under general anesthesia stating he \"wants to get this done.\"    PLAN:  Circumcision at [place] OR 3/27  Hold Xarelto 3d pre-op    Follow-up 2 weeks post op wound check    José Connors MD    Problem List Items Addressed This Visit       Phimosis of penis - Primary    Relevant Orders    Case Request Operating Room: CIRCUMCISION (Completed)    Request for Pre-Admission Testing Visit    Coagulation Screen    Basic Metabolic Panel    CBC     Other Visit Diagnoses       At high risk for hemorrhage after procedure        Relevant Orders    Coagulation Screen    Intraop hemor/hemtom of a  sys org comp a  sys procedure        Relevant Orders    Coagulation Screen    CBC             "

## 2025-03-11 ENCOUNTER — APPOINTMENT (OUTPATIENT)
Dept: PRIMARY CARE | Facility: CLINIC | Age: 89
End: 2025-03-11
Payer: MEDICARE

## 2025-03-11 VITALS
SYSTOLIC BLOOD PRESSURE: 120 MMHG | HEART RATE: 74 BPM | OXYGEN SATURATION: 100 % | WEIGHT: 185.6 LBS | DIASTOLIC BLOOD PRESSURE: 58 MMHG | HEIGHT: 71 IN | BODY MASS INDEX: 25.98 KG/M2 | TEMPERATURE: 98.4 F

## 2025-03-11 DIAGNOSIS — I71.40 ABDOMINAL AORTIC ANEURYSM (AAA) WITHOUT RUPTURE, UNSPECIFIED PART (CMS-HCC): ICD-10-CM

## 2025-03-11 DIAGNOSIS — Z00.00 ROUTINE GENERAL MEDICAL EXAMINATION AT HEALTH CARE FACILITY: Primary | ICD-10-CM

## 2025-03-11 DIAGNOSIS — I48.0 PAROXYSMAL ATRIAL FIBRILLATION (MULTI): ICD-10-CM

## 2025-03-11 DIAGNOSIS — I10 ESSENTIAL HYPERTENSION: ICD-10-CM

## 2025-03-11 DIAGNOSIS — N18.4 STAGE 4 CHRONIC KIDNEY DISEASE (MULTI): ICD-10-CM

## 2025-03-11 DIAGNOSIS — C61 PROSTATIC CANCER (MULTI): ICD-10-CM

## 2025-03-11 DIAGNOSIS — I72.3 ANEURYSM OF LEFT COMMON ILIAC ARTERY (CMS-HCC): ICD-10-CM

## 2025-03-11 PROCEDURE — 1126F AMNT PAIN NOTED NONE PRSNT: CPT | Performed by: FAMILY MEDICINE

## 2025-03-11 PROCEDURE — 99497 ADVNCD CARE PLAN 30 MIN: CPT | Performed by: FAMILY MEDICINE

## 2025-03-11 PROCEDURE — 99214 OFFICE O/P EST MOD 30 MIN: CPT | Performed by: FAMILY MEDICINE

## 2025-03-11 PROCEDURE — 3078F DIAST BP <80 MM HG: CPT | Performed by: FAMILY MEDICINE

## 2025-03-11 PROCEDURE — 1036F TOBACCO NON-USER: CPT | Performed by: FAMILY MEDICINE

## 2025-03-11 PROCEDURE — 1170F FXNL STATUS ASSESSED: CPT | Performed by: FAMILY MEDICINE

## 2025-03-11 PROCEDURE — 3074F SYST BP LT 130 MM HG: CPT | Performed by: FAMILY MEDICINE

## 2025-03-11 PROCEDURE — 1159F MED LIST DOCD IN RCRD: CPT | Performed by: FAMILY MEDICINE

## 2025-03-11 PROCEDURE — G0439 PPPS, SUBSEQ VISIT: HCPCS | Performed by: FAMILY MEDICINE

## 2025-03-11 PROCEDURE — 1160F RVW MEDS BY RX/DR IN RCRD: CPT | Performed by: FAMILY MEDICINE

## 2025-03-11 PROCEDURE — 1157F ADVNC CARE PLAN IN RCRD: CPT | Performed by: FAMILY MEDICINE

## 2025-03-11 PROCEDURE — G0442 ANNUAL ALCOHOL SCREEN 15 MIN: HCPCS | Performed by: FAMILY MEDICINE

## 2025-03-11 RX ORDER — LISINOPRIL 10 MG/1
10 TABLET ORAL DAILY
Qty: 90 TABLET | Refills: 3 | Status: SHIPPED | OUTPATIENT
Start: 2025-03-11 | End: 2026-03-11

## 2025-03-11 ASSESSMENT — PATIENT HEALTH QUESTIONNAIRE - PHQ9
1. LITTLE INTEREST OR PLEASURE IN DOING THINGS: NOT AT ALL
2. FEELING DOWN, DEPRESSED OR HOPELESS: NOT AT ALL
2. FEELING DOWN, DEPRESSED OR HOPELESS: NOT AT ALL
SUM OF ALL RESPONSES TO PHQ9 QUESTIONS 1 AND 2: 0
SUM OF ALL RESPONSES TO PHQ9 QUESTIONS 1 AND 2: 0
1. LITTLE INTEREST OR PLEASURE IN DOING THINGS: NOT AT ALL

## 2025-03-11 ASSESSMENT — ACTIVITIES OF DAILY LIVING (ADL)
BATHING: INDEPENDENT
TAKING_MEDICATION: INDEPENDENT
DOING_HOUSEWORK: INDEPENDENT
DRESSING: INDEPENDENT
GROCERY_SHOPPING: INDEPENDENT
MANAGING_FINANCES: INDEPENDENT

## 2025-03-11 ASSESSMENT — ENCOUNTER SYMPTOMS
LOSS OF SENSATION IN FEET: 0
OCCASIONAL FEELINGS OF UNSTEADINESS: 0
DEPRESSION: 0

## 2025-03-11 ASSESSMENT — PAIN SCALES - GENERAL: PAINLEVEL_OUTOF10: 0-NO PAIN

## 2025-03-11 NOTE — ASSESSMENT & PLAN NOTE
Orders:    lisinopril 10 mg tablet; Take 1 tablet (10 mg) by mouth once daily.    Lipid Panel; Future    Comprehensive Metabolic Panel; Future    CBC and Auto Differential; Future    Urinalysis with Reflex Microscopic; Future

## 2025-03-11 NOTE — PROGRESS NOTES
Subjective   Reason for Visit: Jase Amato is an 89 y.o. male here for a Medicare Wellness visit.     Past Medical, Surgical, and Family History reviewed and updated in chart.    Reviewed all medications by prescribing practitioner or clinical pharmacist (such as prescriptions, OTCs, herbal therapies and supplements) and documented in the medical record.    HPI  89-year-old male for Medicare wellness visit.  He has a circumcision upcoming.  He is active with cardiology.  No other acute complaints today.  He is due for fasting blood work.  Patient Care Team:  Ludwin HEWITT DO as PCP - General  Ludwin HEWITT DO as PCP [facility]ributed [facility]  Mike Gutiérrez MD as Consulting Physician (Cardiology)     Review of Systems  Constitutional: no chills, no fever and no night sweats.   Eyes: no blurred vision and no eyesight problems.   ENT: no hearing loss, no nasal congestion, no nasal discharge, no hoarseness and no sore throat.   Cardiovascular: no chest pain, no intermittent leg claudication, no lower extremity edema, no palpitations and no syncope.   Respiratory: no cough, no shortness of breath during exertion, no shortness of breath at rest and no wheezing.   Gastrointestinal: no abdominal pain, no blood in stools, no constipation, no diarrhea, no melena, no nausea, no rectal pain and no vomiting.   Genitourinary: no dysuria, no change in urinary frequency, no urinary hesitancy and no feelings of urinary urgency.   Neurological: no difficulty walking, no headache, no limb weakness, no numbness and no tingling.   Psychiatric: no anxiety, no depression, no anhedonia and no substance use disorders.   Endocrine: no recent weight gain and no recent weight loss.   Hematologic/Lymphatic: no tendency for easy bruising and no swollen glands.  Objective   Vitals:  /58 (BP Location: Left arm, Patient Position: Sitting, BP Cuff Size: Adult)   Pulse 74   Temp 36.9 °C (98.4 °F) (Temporal)   Ht  "1.803 m (5' 11\")   Wt 84.2 kg (185 lb 9.6 oz)   SpO2 100%   BMI 25.89 kg/m²       Physical Exam  Constitutional: Alert and in no acute distress. Well developed, well nourished.   Eyes: Pupils were equal in size, round, reactive to light (PERRL) with normal accommodation and extraocular movements intact (EOMI). Ophthalmoscopic examination: Normal.   Ears, Nose, Mouth, and Throat: External inspection of ears and nose: Normal. Otoscopic examination: Normal. Lips, teeth, and gums: Normal. Oropharynx: Normal.   Neck: No neck mass was observed. Supple. Thyroid not enlarged and there were no palpable thyroid nodules.   Cardiovascular: Heart rate and rhythm were normal, normal S1 and S2, no gallops, no murmurs and no pericardial rub. Carotid pulses: Normal with no bruits. Abdominal aorta: Normal. Pedal pulses: Normal. No peripheral edema.   Pulmonary: No respiratory distress. Clear bilateral breath sounds.   Abdomen: Soft nontender; no abdominal mass palpated. Normal bowel sounds. No organomegaly.   Skin: Normal skin color and pigmentation, normal skin turgor, and no rash.   Psychiatric: Judgment and insight: Intact. Mood and affect: Normal.  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    Lipid Panel; Future    Comprehensive Metabolic Panel; Future    CBC and Auto Differential; Future    Urinalysis with Reflex Microscopic; Future           Advance Directives " Discussion  16 - 20 minutes were spent discussing Advanced Care Planning (including a Living Will, Medical Power Of , as well as specific end of life choices and/or directives). The details of that discussion were documented in Advanced Directives Discussion section of the medical record.     Alcohol Misuse Screen  5 - 10 minutes were spent screening the patient for alcohol misuse disorder.

## 2025-03-12 LAB
ALBUMIN SERPL-MCNC: 4.2 G/DL (ref 3.6–5.1)
ALBUMIN/CREAT UR: NORMAL
ALP SERPL-CCNC: 66 U/L (ref 35–144)
ALT SERPL-CCNC: 28 U/L (ref 9–46)
ANION GAP SERPL CALCULATED.4IONS-SCNC: 8 MMOL/L (CALC) (ref 7–17)
APPEARANCE UR: CLEAR
AST SERPL-CCNC: 16 U/L (ref 10–35)
BACTERIA #/AREA URNS HPF: ABNORMAL /HPF
BASOPHILS # BLD AUTO: 22 CELLS/UL (ref 0–200)
BASOPHILS NFR BLD AUTO: 0.4 %
BILIRUB SERPL-MCNC: 0.5 MG/DL (ref 0.2–1.2)
BILIRUB UR QL STRIP: NEGATIVE
BUN SERPL-MCNC: 24 MG/DL (ref 7–25)
CALCIUM SERPL-MCNC: 9.2 MG/DL (ref 8.6–10.3)
CHLORIDE SERPL-SCNC: 103 MMOL/L (ref 98–110)
CHOLEST SERPL-MCNC: 134 MG/DL
CHOLEST/HDLC SERPL: 3.4 (CALC)
CO2 SERPL-SCNC: 26 MMOL/L (ref 20–32)
COLOR UR: YELLOW
CREAT SERPL-MCNC: 1.22 MG/DL (ref 0.7–1.22)
CREAT UR-MCNC: NORMAL MG/DL
EGFRCR SERPLBLD CKD-EPI 2021: 57 ML/MIN/1.73M2
EOSINOPHIL # BLD AUTO: 70 CELLS/UL (ref 15–500)
EOSINOPHIL NFR BLD AUTO: 1.3 %
ERYTHROCYTE [DISTWIDTH] IN BLOOD BY AUTOMATED COUNT: 14.1 % (ref 11–15)
GLUCOSE SERPL-MCNC: 108 MG/DL (ref 65–99)
GLUCOSE UR QL STRIP: NEGATIVE
HCT VFR BLD AUTO: 37.2 % (ref 38.5–50)
HDLC SERPL-MCNC: 40 MG/DL
HGB BLD-MCNC: 12.5 G/DL (ref 13.2–17.1)
HGB UR QL STRIP: NEGATIVE
HYALINE CASTS #/AREA URNS LPF: ABNORMAL /LPF
KETONES UR QL STRIP: NEGATIVE
LDLC SERPL CALC-MCNC: 68 MG/DL (CALC)
LEUKOCYTE ESTERASE UR QL STRIP: NEGATIVE
LYMPHOCYTES # BLD AUTO: 983 CELLS/UL (ref 850–3900)
LYMPHOCYTES NFR BLD AUTO: 18.2 %
MCH RBC QN AUTO: 33.2 PG (ref 27–33)
MCHC RBC AUTO-ENTMCNC: 33.6 G/DL (ref 32–36)
MCV RBC AUTO: 98.9 FL (ref 80–100)
MICROALBUMIN UR-MCNC: NORMAL
MONOCYTES # BLD AUTO: 383 CELLS/UL (ref 200–950)
MONOCYTES NFR BLD AUTO: 7.1 %
NEUTROPHILS # BLD AUTO: 3942 CELLS/UL (ref 1500–7800)
NEUTROPHILS NFR BLD AUTO: 73 %
NITRITE UR QL STRIP: NEGATIVE
NONHDLC SERPL-MCNC: 94 MG/DL (CALC)
PH UR STRIP: 8 [PH] (ref 5–8)
PLATELET # BLD AUTO: 244 THOUSAND/UL (ref 140–400)
PMV BLD REES-ECKER: 9.7 FL (ref 7.5–12.5)
POTASSIUM SERPL-SCNC: 4.9 MMOL/L (ref 3.5–5.3)
PROT SERPL-MCNC: 6.5 G/DL (ref 6.1–8.1)
PROT UR QL STRIP: ABNORMAL
PSA FREE MFR SERPL: NORMAL % (CALC)
PSA FREE SERPL-MCNC: <0.1 NG/ML
PSA SERPL-MCNC: 0.1 NG/ML
RBC # BLD AUTO: 3.76 MILLION/UL (ref 4.2–5.8)
RBC #/AREA URNS HPF: ABNORMAL /HPF
SERVICE CMNT-IMP: ABNORMAL
SODIUM SERPL-SCNC: 137 MMOL/L (ref 135–146)
SP GR UR STRIP: 1.02 (ref 1–1.03)
SQUAMOUS #/AREA URNS HPF: ABNORMAL /HPF
TRI-PHOS CRY #/AREA URNS HPF: ABNORMAL /HPF
TRIGL SERPL-MCNC: 192 MG/DL
WBC # BLD AUTO: 5.4 THOUSAND/UL (ref 3.8–10.8)
WBC #/AREA URNS HPF: ABNORMAL /HPF

## 2025-03-21 LAB
ALBUMIN SERPL-MCNC: 4.2 G/DL (ref 3.6–5.1)
ALP SERPL-CCNC: 66 U/L (ref 35–144)
ALT SERPL-CCNC: 28 U/L (ref 9–46)
ANION GAP SERPL CALCULATED.4IONS-SCNC: 8 MMOL/L (CALC) (ref 7–17)
APPEARANCE UR: CLEAR
AST SERPL-CCNC: 16 U/L (ref 10–35)
BACTERIA #/AREA URNS HPF: ABNORMAL /HPF
BASOPHILS # BLD AUTO: 22 CELLS/UL (ref 0–200)
BASOPHILS NFR BLD AUTO: 0.4 %
BILIRUB SERPL-MCNC: 0.5 MG/DL (ref 0.2–1.2)
BILIRUB UR QL STRIP: NEGATIVE
BUN SERPL-MCNC: 24 MG/DL (ref 7–25)
CALCIUM SERPL-MCNC: 9.2 MG/DL (ref 8.6–10.3)
CHLORIDE SERPL-SCNC: 103 MMOL/L (ref 98–110)
CHOLEST SERPL-MCNC: 134 MG/DL
CHOLEST/HDLC SERPL: 3.4 (CALC)
CO2 SERPL-SCNC: 26 MMOL/L (ref 20–32)
COLOR UR: YELLOW
CREAT SERPL-MCNC: 1.22 MG/DL (ref 0.7–1.22)
CREAT UR-MCNC: NORMAL MG/DL
EGFRCR SERPLBLD CKD-EPI 2021: 57 ML/MIN/1.73M2
EOSINOPHIL # BLD AUTO: 70 CELLS/UL (ref 15–500)
EOSINOPHIL NFR BLD AUTO: 1.3 %
ERYTHROCYTE [DISTWIDTH] IN BLOOD BY AUTOMATED COUNT: 14.1 % (ref 11–15)
GLUCOSE SERPL-MCNC: 108 MG/DL (ref 65–99)
GLUCOSE UR QL STRIP: NEGATIVE
HCT VFR BLD AUTO: 37.2 % (ref 38.5–50)
HDLC SERPL-MCNC: 40 MG/DL
HGB BLD-MCNC: 12.5 G/DL (ref 13.2–17.1)
HGB UR QL STRIP: NEGATIVE
HYALINE CASTS #/AREA URNS LPF: ABNORMAL /LPF
KETONES UR QL STRIP: NEGATIVE
LDLC SERPL CALC-MCNC: 68 MG/DL (CALC)
LEUKOCYTE ESTERASE UR QL STRIP: NEGATIVE
LYMPHOCYTES # BLD AUTO: 983 CELLS/UL (ref 850–3900)
LYMPHOCYTES NFR BLD AUTO: 18.2 %
MCH RBC QN AUTO: 33.2 PG (ref 27–33)
MCHC RBC AUTO-ENTMCNC: 33.6 G/DL (ref 32–36)
MCV RBC AUTO: 98.9 FL (ref 80–100)
MICROALBUMIN UR-MCNC: NORMAL MG/DL
MONOCYTES # BLD AUTO: 383 CELLS/UL (ref 200–950)
MONOCYTES NFR BLD AUTO: 7.1 %
NEUTROPHILS # BLD AUTO: 3942 CELLS/UL (ref 1500–7800)
NEUTROPHILS NFR BLD AUTO: 73 %
NITRITE UR QL STRIP: NEGATIVE
NONHDLC SERPL-MCNC: 94 MG/DL (CALC)
PH UR STRIP: 8 [PH] (ref 5–8)
PLATELET # BLD AUTO: 244 THOUSAND/UL (ref 140–400)
PMV BLD REES-ECKER: 9.7 FL (ref 7.5–12.5)
POTASSIUM SERPL-SCNC: 4.9 MMOL/L (ref 3.5–5.3)
PROT SERPL-MCNC: 6.5 G/DL (ref 6.1–8.1)
PROT UR QL STRIP: ABNORMAL
PSA FREE MFR SERPL: NORMAL % (CALC)
PSA FREE SERPL-MCNC: <0.1 NG/ML
PSA SERPL-MCNC: 0.1 NG/ML
RBC # BLD AUTO: 3.76 MILLION/UL (ref 4.2–5.8)
RBC #/AREA URNS HPF: ABNORMAL /HPF
SERVICE CMNT-IMP: ABNORMAL
SODIUM SERPL-SCNC: 137 MMOL/L (ref 135–146)
SP GR UR STRIP: 1.02 (ref 1–1.03)
SQUAMOUS #/AREA URNS HPF: ABNORMAL /HPF
TRI-PHOS CRY #/AREA URNS HPF: ABNORMAL /HPF
TRIGL SERPL-MCNC: 192 MG/DL
WBC # BLD AUTO: 5.4 THOUSAND/UL (ref 3.8–10.8)
WBC #/AREA URNS HPF: ABNORMAL /HPF

## 2025-03-24 RX ORDER — ACETAMINOPHEN 500 MG
2000 TABLET ORAL DAILY
COMMUNITY

## 2025-03-24 NOTE — PREPROCEDURE INSTRUCTIONS
Current Medications   Medication Instructions    atorvastatin (Lipitor) 40 mg tablet Continue until night before surgery    cholecalciferol (Vitamin D3) 50 mcg (2,000 unit) capsule Stop 2 days before surgery. Hold dose day of surgery    escitalopram (Lexapro) 10 mg tablet Take morning of surgery with sip of water    furosemide (Lasix) 40 mg tablet Continue until night before surgery    latanoprost (Xalatan) 0.005 % ophthalmic solution Use as prescribed    lisinopril 10 mg tablet Hold am dose day of surgery    metoprolol succinate XL (Toprol-XL) 50 mg 24 hr tablet Continue until night before surgery    Xarelto 20 mg tablet Hold 3 days, last dose 3/23/25          NPO Instructions:    Do not eat any food after midnight the night before your surgery/procedure.  You may have 13 ounces of clear liquids until TWO hours before surgery/procedure tme. This includes water, black tea/coffee, (no milk or cream) apple juice and electrolyte drinks (Gatorade).  You may chew gum up to TWO hours before your surgery/procedure.    Additional Instructions:     You will be receiving a phone call from surgery scheduling the day before your surgery with times. If you don't receive a phone call by 2pm that day, call [phone].    Enter through the main entrance of [facility], located at [address] [facility]. Proceed to registration, located on the right hand side of the staircase. You will need your ID and insurance card for registration. Please ensure you have a responsible adult to drive you home. A responsible adult DOES NOT include rideshare service drivers (Uber, Lyft, etc), cab drivers, or public transportation drivers, but “provide a ride” is acceptable.    Take a shower before your procedure. After your shower avoid lotions, powders, deodorants or anything applied to the skin. If you wear contacts or glasses, wear the glasses. If you do not have glasses, please bring a case for your contacts. You may wear hearing aids  and dentures, bring a case for them or we will provide one. Make sure you wear something loose and comfortable. Keep in mind your surgical procedure and wear something that will accommodate incisions or bandages. Please remove all jewelry and piercing's.     For further questions Alex [employer] can be contacted at [phone] between 7AM-3PM.

## 2025-03-27 ENCOUNTER — ANESTHESIA EVENT (OUTPATIENT)
Dept: OPERATING ROOM | Facility: HOSPITAL | Age: 89
End: 2025-03-27
Payer: MEDICARE

## 2025-03-27 ENCOUNTER — ANESTHESIA (OUTPATIENT)
Dept: OPERATING ROOM | Facility: HOSPITAL | Age: 89
End: 2025-03-27
Payer: MEDICARE

## 2025-03-27 ENCOUNTER — HOSPITAL ENCOUNTER (OUTPATIENT)
Facility: HOSPITAL | Age: 89
Setting detail: OUTPATIENT SURGERY
Discharge: HOME | End: 2025-03-27
Attending: STUDENT IN AN ORGANIZED HEALTH CARE EDUCATION/TRAINING PROGRAM | Admitting: STUDENT IN AN ORGANIZED HEALTH CARE EDUCATION/TRAINING PROGRAM
Payer: MEDICARE

## 2025-03-27 VITALS
OXYGEN SATURATION: 98 % | DIASTOLIC BLOOD PRESSURE: 53 MMHG | HEART RATE: 57 BPM | SYSTOLIC BLOOD PRESSURE: 94 MMHG | TEMPERATURE: 96.8 F | HEIGHT: 71 IN | WEIGHT: 183 LBS | BODY MASS INDEX: 25.62 KG/M2 | RESPIRATION RATE: 16 BRPM

## 2025-03-27 DIAGNOSIS — N47.1 PHIMOSIS OF PENIS: Primary | ICD-10-CM

## 2025-03-27 LAB
POCT INTERNATIONAL NORMALIZATION RATIO: 1
POCT PROTHROMBIN TIME: 11.9 SECONDS

## 2025-03-27 PROCEDURE — 2500000004 HC RX 250 GENERAL PHARMACY W/ HCPCS (ALT 636 FOR OP/ED): Performed by: STUDENT IN AN ORGANIZED HEALTH CARE EDUCATION/TRAINING PROGRAM

## 2025-03-27 PROCEDURE — 2500000005 HC RX 250 GENERAL PHARMACY W/O HCPCS: Performed by: STUDENT IN AN ORGANIZED HEALTH CARE EDUCATION/TRAINING PROGRAM

## 2025-03-27 PROCEDURE — 7100000001 HC RECOVERY ROOM TIME - INITIAL BASE CHARGE: Performed by: STUDENT IN AN ORGANIZED HEALTH CARE EDUCATION/TRAINING PROGRAM

## 2025-03-27 PROCEDURE — 54161 CIRCUM 28 DAYS OR OLDER: CPT | Performed by: STUDENT IN AN ORGANIZED HEALTH CARE EDUCATION/TRAINING PROGRAM

## 2025-03-27 PROCEDURE — 2720000007 HC OR 272 NO HCPCS: Performed by: STUDENT IN AN ORGANIZED HEALTH CARE EDUCATION/TRAINING PROGRAM

## 2025-03-27 PROCEDURE — 3600000002 HC OR TIME - INITIAL BASE CHARGE - PROCEDURE LEVEL TWO: Performed by: STUDENT IN AN ORGANIZED HEALTH CARE EDUCATION/TRAINING PROGRAM

## 2025-03-27 PROCEDURE — 2500000004 HC RX 250 GENERAL PHARMACY W/ HCPCS (ALT 636 FOR OP/ED): Performed by: ANESTHESIOLOGIST ASSISTANT

## 2025-03-27 PROCEDURE — 3700000001 HC GENERAL ANESTHESIA TIME - INITIAL BASE CHARGE: Performed by: STUDENT IN AN ORGANIZED HEALTH CARE EDUCATION/TRAINING PROGRAM

## 2025-03-27 PROCEDURE — 7100000010 HC PHASE TWO TIME - EACH INCREMENTAL 1 MINUTE: Performed by: STUDENT IN AN ORGANIZED HEALTH CARE EDUCATION/TRAINING PROGRAM

## 2025-03-27 PROCEDURE — 3700000002 HC GENERAL ANESTHESIA TIME - EACH INCREMENTAL 1 MINUTE: Performed by: STUDENT IN AN ORGANIZED HEALTH CARE EDUCATION/TRAINING PROGRAM

## 2025-03-27 PROCEDURE — 3600000007 HC OR TIME - EACH INCREMENTAL 1 MINUTE - PROCEDURE LEVEL TWO: Performed by: STUDENT IN AN ORGANIZED HEALTH CARE EDUCATION/TRAINING PROGRAM

## 2025-03-27 PROCEDURE — 7100000009 HC PHASE TWO TIME - INITIAL BASE CHARGE: Performed by: STUDENT IN AN ORGANIZED HEALTH CARE EDUCATION/TRAINING PROGRAM

## 2025-03-27 PROCEDURE — 7100000002 HC RECOVERY ROOM TIME - EACH INCREMENTAL 1 MINUTE: Performed by: STUDENT IN AN ORGANIZED HEALTH CARE EDUCATION/TRAINING PROGRAM

## 2025-03-27 RX ORDER — LIDOCAINE HYDROCHLORIDE 10 MG/ML
INJECTION, SOLUTION INFILTRATION; PERINEURAL AS NEEDED
Status: DISCONTINUED | OUTPATIENT
Start: 2025-03-27 | End: 2025-03-27 | Stop reason: HOSPADM

## 2025-03-27 RX ORDER — ONDANSETRON HYDROCHLORIDE 2 MG/ML
INJECTION, SOLUTION INTRAVENOUS AS NEEDED
Status: DISCONTINUED | OUTPATIENT
Start: 2025-03-27 | End: 2025-03-27

## 2025-03-27 RX ORDER — WATER 1 ML/ML
INJECTION IRRIGATION AS NEEDED
Status: DISCONTINUED | OUTPATIENT
Start: 2025-03-27 | End: 2025-03-27 | Stop reason: HOSPADM

## 2025-03-27 RX ORDER — PHENYLEPHRINE HCL IN 0.9% NACL 1 MG/10 ML
SYRINGE (ML) INTRAVENOUS AS NEEDED
Status: DISCONTINUED | OUTPATIENT
Start: 2025-03-27 | End: 2025-03-27

## 2025-03-27 RX ORDER — CEFAZOLIN SODIUM 2 G/100ML
2 INJECTION, SOLUTION INTRAVENOUS ONCE
Status: COMPLETED | OUTPATIENT
Start: 2025-03-27 | End: 2025-03-27

## 2025-03-27 RX ORDER — LIDOCAINE HCL/PF 100 MG/5ML
SYRINGE (ML) INTRAVENOUS AS NEEDED
Status: DISCONTINUED | OUTPATIENT
Start: 2025-03-27 | End: 2025-03-27

## 2025-03-27 RX ORDER — PROPOFOL 10 MG/ML
INJECTION, EMULSION INTRAVENOUS AS NEEDED
Status: DISCONTINUED | OUTPATIENT
Start: 2025-03-27 | End: 2025-03-27

## 2025-03-27 RX ORDER — CHLORHEXIDINE GLUCONATE 40 MG/ML
SOLUTION TOPICAL DAILY PRN
Status: DISCONTINUED | OUTPATIENT
Start: 2025-03-27 | End: 2025-03-27 | Stop reason: HOSPADM

## 2025-03-27 RX ORDER — FENTANYL CITRATE 50 UG/ML
INJECTION, SOLUTION INTRAMUSCULAR; INTRAVENOUS AS NEEDED
Status: DISCONTINUED | OUTPATIENT
Start: 2025-03-27 | End: 2025-03-27

## 2025-03-27 RX ORDER — BUPIVACAINE HYDROCHLORIDE 5 MG/ML
INJECTION, SOLUTION PERINEURAL AS NEEDED
Status: DISCONTINUED | OUTPATIENT
Start: 2025-03-27 | End: 2025-03-27 | Stop reason: HOSPADM

## 2025-03-27 RX ADMIN — SODIUM CHLORIDE, POTASSIUM CHLORIDE, SODIUM LACTATE AND CALCIUM CHLORIDE: 600; 310; 30; 20 INJECTION, SOLUTION INTRAVENOUS at 10:28

## 2025-03-27 RX ADMIN — CEFAZOLIN SODIUM 2 G: 2 INJECTION, SOLUTION INTRAVENOUS at 10:41

## 2025-03-27 RX ADMIN — Medication 200 MCG: at 10:54

## 2025-03-27 RX ADMIN — PROPOFOL 50 MG: 10 INJECTION, EMULSION INTRAVENOUS at 10:39

## 2025-03-27 RX ADMIN — Medication 100 MCG: at 11:02

## 2025-03-27 RX ADMIN — LIDOCAINE HYDROCHLORIDE 5 MG: 20 INJECTION INTRAVENOUS at 10:37

## 2025-03-27 RX ADMIN — DEXAMETHASONE SODIUM PHOSPHATE 4 MG: 4 INJECTION, SOLUTION INTRAMUSCULAR; INTRAVENOUS at 10:53

## 2025-03-27 RX ADMIN — Medication 200 MCG: at 11:10

## 2025-03-27 RX ADMIN — FENTANYL CITRATE 50 MCG: 50 INJECTION, SOLUTION INTRAMUSCULAR; INTRAVENOUS at 10:37

## 2025-03-27 RX ADMIN — Medication 200 MCG: at 11:05

## 2025-03-27 RX ADMIN — FENTANYL CITRATE 50 MCG: 50 INJECTION, SOLUTION INTRAMUSCULAR; INTRAVENOUS at 11:35

## 2025-03-27 RX ADMIN — ONDANSETRON 4 MG: 2 INJECTION INTRAMUSCULAR; INTRAVENOUS at 11:36

## 2025-03-27 RX ADMIN — PROPOFOL 100 MG: 10 INJECTION, EMULSION INTRAVENOUS at 10:37

## 2025-03-27 RX ADMIN — PROPOFOL 30 MG: 10 INJECTION, EMULSION INTRAVENOUS at 10:56

## 2025-03-27 RX ADMIN — PROPOFOL 20 MG: 10 INJECTION, EMULSION INTRAVENOUS at 11:28

## 2025-03-27 SDOH — HEALTH STABILITY: MENTAL HEALTH: CURRENT SMOKER: 0

## 2025-03-27 ASSESSMENT — PAIN - FUNCTIONAL ASSESSMENT
PAIN_FUNCTIONAL_ASSESSMENT: 0-10

## 2025-03-27 ASSESSMENT — PAIN SCALES - GENERAL
PAINLEVEL_OUTOF10: 0 - NO PAIN
PAIN_LEVEL: 0

## 2025-03-27 NOTE — ANESTHESIA POSTPROCEDURE EVALUATION
Patient: Jase Amato    Procedure Summary       Date: 03/27/25 Room / Location: [facility] OR 03 / Virtual [facility] OR    Anesthesia Start: 1028 Anesthesia Stop: 1156    Procedure: CIRCUMCISION (Penis) Diagnosis:       Phimosis of penis      (Phimosis of penis [N47.1])    Surgeons: José Connors MD Responsible Provider: Torres Boggs MD    Anesthesia Type: general ASA Status: 3            Anesthesia Type: general    Vitals Value Taken Time   BP 92/51 03/27/25 1156   Temp 36.6 03/27/25 1156   Pulse 58 03/27/25 1156   Resp 16 03/27/25 1156   SpO2 100% 03/27/25 1156       Anesthesia Post Evaluation    Patient location during evaluation: PACU  Patient participation: complete - patient participated  Level of consciousness: sleepy but conscious  Pain score: 0  Pain management: satisfactory to patient  Airway patency: patent  Cardiovascular status: acceptable and hemodynamically stable  Respiratory status: acceptable, nonlabored ventilation, spontaneous ventilation and face mask  Hydration status: acceptable  Postoperative Nausea and Vomiting: none        There were no known notable events for this encounter.

## 2025-03-27 NOTE — OP NOTE
CIRCUMCISION Operative Note     Date: 3/27/2025  OR Location: [facility] OR    Name: Jase Amato, : 1935, Age: 89 y.o., MRN: [id number], Sex: male    Diagnosis  Pre-op Diagnosis      * Phimosis of penis [N47.1] Post-op Diagnosis     * Phimosis of penis [N47.1]     Procedures  Circumcision    Surgeons      * José Connors - Primary    Resident/Fellow/Other Assistant:  Surgeons and Role:  * No surgeons found with a matching role *    Staff:   Circulator: Chelsea  Surgical Assistant: Michael Aquino Person: Jayashree Chase Circulator: Celine    Anesthesia Staff: Anesthesiologist: Torres Boggs MD  C-AA: TERESO Ward    Procedure Summary  Anesthesia: General  ASA: III  Estimated Blood Loss: 25mL  Intra-op Medications: * Intraprocedure medication information is unavailable because the case start and end events have not been set *           Anesthesia Record               Intraprocedure I/O Totals       None           Specimen: No specimens collected              Drains and/or Catheters: None    Findings: Phimotic foreskin removed, hemostatic    Indications: Jase Amato is an 89 y.o. male who is having surgery for Phimosis of penis [N47.1].     April held 2d  Ancef 2g    The patient was seen in the preoperative area. The risks, benefits, complications, treatment options, non-operative alternatives, expected recovery and outcomes were discussed with the patient. The possibilities of reaction to medication, pulmonary aspiration, injury to surrounding structures, bleeding, recurrent infection, the need for additional procedures, failure to diagnose a condition, and creating a complication requiring transfusion or operation were discussed with the patient. The patient concurred with the proposed plan, giving informed consent.  The site of surgery was properly noted/marked if necessary per policy. The patient has been actively warmed in preoperative area. Preoperative antibiotics have been ordered  and given within 1 hours of incision. Venous thrombosis prophylaxis have been ordered including bilateral sequential compression devices    Procedure Details:   Patient transferred to the operative suite.  General anesthesia was induced.  Positioned supine and prepped and draped in the usual sterile fashion.  Operative pause performed.    Penile block performed with 10ml of 1:1 mix of 1% lidocaine : 0.5% bupivacaine.  Foreskin fully retracted and re-prepped as needed with betadine.  Distal and proximal circumcising incisions marked out and incised with 15 blade.  Dartos divided circumferentially with electrocautery.  Foreskin divided laterally and completely excised with no injury to the neurovascular bundle or urethra.  Ensured meticulous hemostasis with pinpoint application of electrocautery.  Skin re-approximated with 3-0 chromic.  Dressed with dermabond, followed by cling wrap and coban.    This concluded the procedure which the patient tolerated well.  Patient was cleaned, dried and awakened from anesthesia and transferred to PACU in excellent condition.    Plan:  Discharge home when meeting criteria  Remove dressing if needed to urinate or at latest in 24 hours  Follow up in 2 weeks for wound check     Complications:  None; patient tolerated the procedure well.    Disposition: PACU - hemodynamically stable.  Condition: stable         Task Performed by RNFA or Surgical Assistant:  none          Additional Details: none    Attending Attestation: I was present and scrubbed for the entire procedure.    José Connors  Phone Number: [phone]

## 2025-03-27 NOTE — ANESTHESIA PROCEDURE NOTES
Airway  Date/Time: 3/27/2025 10:40 AM  Urgency: elective      Staffing  Performed: TERESO   Authorized by: Torres Boggs MD    Performed by: TERESO Ward  Patient location during procedure: OR    Indications and Patient Condition  Indications for airway management: anesthesia  Sedation level: deep  Preoxygenated: yes  Patient position: sniffing  Mask difficulty assessment: 1 - vent by mask    Final Airway Details  Final airway type: supraglottic airway      Successful airway: classic  Size 5     Number of attempts at approach: 1

## 2025-03-27 NOTE — DISCHARGE INSTRUCTIONS
DEPARTMENT OF UROLOGY  DISCHARGE INSTRUCTIONS CIRCUMCISION  Outpatient Surgery      Jase Connors's contact information:  - Nurse line (clinical questions):  [phone]  - Admin line (scheduling questions):  [phone]    Call [phone] after 5:00 pm or on weekends and ask for the Urology resident with any questions or concerns.      If it is a life-threatening situation, proceed to the nearest emergency department.        Follow-up appointment:  4/9/25     Thank you for the opportunity to care for you today.  Your health and healing are very important to us.  We hope we made you feel as comfortable as possible and are committed to your recovery and continued well-being.      The following is a brief overview of your circumcision procedure today. Some of the information contained on this summary may be confidential.  This information should be kept in your records and should be shared with your regular doctor.    Physicians:   Dr. Connors      Procedure performed: removed the foreskin from your penis    What to Expect During your Recovery and Home Care  Anesthesia Side Effects   You received anesthesia today.  You may feel sleepy, tired, or have a sore throat.   You may also feel drowsiness, dizziness, or inability to think clearly.  For your safety, do not drive, drink alcoholic beverages, take any unprescribed medication or make any important decisions for 24 hours.  A responsible adult should be with you for 24 hours.        Activity and Recovery    No heavy lifting (over 10 pounds) for the next 2 weeks   Penis is to be used only for urinating for the next 2 weeks    Pain Control  Unfortunately, you may experience pain after your procedure.  Adequate pain management can include over the counter Tylenol/ibuprofen. Do not take more than 4,000mg of Tylenol in a 24-hour period.  You may also use ice to relieve swelling, though ice should not be placed directly on the  skin.    Nausea/Vomiting   Clear liquids are best tolerated at first. Start slow, advance your diet as tolerated to normal foods. Avoid spicy, greasy, heavy foods at first. Also, you may feel nauseous or like you need to vomit if you take any type of medication on an empty stomach.  Call your physician if you are unable to eat or drink and have persistent vomiting.    Signs of Bleeding   Minor bleeding or drainage may occur from the surgical site; however, excessive or consistent bleeding should be reported to your surgeon. Excessive bleeding is defined as blood that is dripping from wound, soaking your bandages, and is ketchup colored, thick with possible blood clots.  Consistent is defined as bleeding that does not stop.      Treatment/wound care:   Keep area(s) clean and dry. Gauze dressing can be removed if needed to urinate or at latest 24 hours after surgery.  It is okay to shower 24 hours after time of surgery.    Do not scrub wound(s), pat dry.    Do not submerge wound(s) in standing water until seen for follow up appointment (no tub bathing, swimming, or hot tubs).    Clean with mild soap, gentle washing, pat dry, cover with bandage as needed.  Avoid waterproof bandages.  No oils or lotions on incisions.  Do not remove the sutures on your own, they will dissolve or fall out on their own time.    Please visually inspect your wound(s) at least once daily.  If the wound(s) are in a difficult to see location, please use a mirror or have someone else assist with visual inspection.    Signs of Infection  Signs of infection can include fever, redness, heat, swelling, drainage(green/yellow), chills, burning sensation with passing of urine, or severe abdominal pain.  If you see any of these occur, please contact your doctor's office at [phone]. Any fever higher than 100.4, especially if associated with an ill feeling, abdominal pain, chills, or nausea should be reported to your surgeon.      Assist in bowel  movements/urination  Increase fiber in diet  Increase water (6 to 8 glasses)  Increase walking   Urination should occur within 6 hours of anesthesia  If you have tried these methods and your bladder still feels full and you cannot use the bathroom, please go to your nearest Emergency room/contact your physician.    Additional Instructions:   Restart Xarelto on Sunday, 3/30

## 2025-03-27 NOTE — ANESTHESIA PREPROCEDURE EVALUATION
Patient: Jase Amato    Procedure Information       Date/Time: 03/27/25 1030    Procedure: CIRCUMCISION    Location: [facility] / [facility]    Surgeons: José Connors MD            Relevant Problems   Anesthesia (within normal limits)      Cardiac   (+) AAA (abdominal aortic aneurysm) (CMS-HCC)   (+) Chronic diastolic congestive heart failure   (+) Complete right bundle branch block   (+) Essential hypertension   (+) Paroxysmal atrial fibrillation (Multi)      Neuro   (+) Anxiety   (+) Bilateral carotid artery stenosis   (+) Stenosis of carotid artery      GI   (+) Hiatal hernia      /Renal   (+) Prostatic cancer (Multi)      Hematology   (+) Anemia   (+) Anemia, iron deficiency      ID   (+) COVID-19       Clinical information reviewed:   Tobacco  Allergies  Meds   Med Hx  Surg Hx   Fam Hx  Soc Hx        NPO Detail:  NPO/Void Status  Date of Last Liquid: 03/27/25  Time of Last Liquid: 0630 (water with meds)  Date of Last Solid: 03/26/25  Time of Last Solid: 1700         Physical Exam    Airway  Mallampati: II  TM distance: >3 FB  Neck ROM: full     Cardiovascular - normal exam  Rhythm: regular  Rate: normal     Dental   (+) lower dentures, upper dentures     Pulmonary - normal exam  Breath sounds clear to auscultation     Abdominal            Anesthesia Plan    History of general anesthesia?: yes  History of complications of general anesthesia?: no    ASA 3     general     The patient is not a current smoker.    intravenous induction   Postoperative administration of opioids is intended.  Trial extubation is planned.  Anesthetic plan and risks discussed with patient.  Use of blood products discussed with patient who.    Plan discussed with [facility].

## 2025-03-31 ENCOUNTER — APPOINTMENT (OUTPATIENT)
Dept: UROLOGY | Facility: CLINIC | Age: 89
End: 2025-03-31
Payer: MEDICARE

## 2025-04-03 DIAGNOSIS — I50.32 CHRONIC DIASTOLIC CONGESTIVE HEART FAILURE: ICD-10-CM

## 2025-04-04 ENCOUNTER — APPOINTMENT (OUTPATIENT)
Dept: HEMATOLOGY/ONCOLOGY | Facility: CLINIC | Age: 89
End: 2025-04-04
Payer: MEDICARE

## 2025-04-04 RX ORDER — FUROSEMIDE 40 MG/1
40 TABLET ORAL 3 TIMES WEEKLY
Qty: 39 TABLET | Refills: 3 | Status: SHIPPED | OUTPATIENT
Start: 2025-04-04

## 2025-04-09 ENCOUNTER — APPOINTMENT (OUTPATIENT)
Facility: CLINIC | Age: 89
End: 2025-04-09
Payer: MEDICARE

## 2025-04-09 VITALS
TEMPERATURE: 97.7 F | HEART RATE: 65 BPM | BODY MASS INDEX: 26.8 KG/M2 | DIASTOLIC BLOOD PRESSURE: 64 MMHG | WEIGHT: 191.4 LBS | HEIGHT: 71 IN | SYSTOLIC BLOOD PRESSURE: 132 MMHG

## 2025-04-09 DIAGNOSIS — N47.1 PHIMOSIS: Primary | ICD-10-CM

## 2025-04-09 PROCEDURE — 1157F ADVNC CARE PLAN IN RCRD: CPT | Performed by: STUDENT IN AN ORGANIZED HEALTH CARE EDUCATION/TRAINING PROGRAM

## 2025-04-09 PROCEDURE — 99024 POSTOP FOLLOW-UP VISIT: CPT | Performed by: STUDENT IN AN ORGANIZED HEALTH CARE EDUCATION/TRAINING PROGRAM

## 2025-04-09 PROCEDURE — 1036F TOBACCO NON-USER: CPT | Performed by: STUDENT IN AN ORGANIZED HEALTH CARE EDUCATION/TRAINING PROGRAM

## 2025-04-09 PROCEDURE — 3075F SYST BP GE 130 - 139MM HG: CPT | Performed by: STUDENT IN AN ORGANIZED HEALTH CARE EDUCATION/TRAINING PROGRAM

## 2025-04-09 PROCEDURE — 1160F RVW MEDS BY RX/DR IN RCRD: CPT | Performed by: STUDENT IN AN ORGANIZED HEALTH CARE EDUCATION/TRAINING PROGRAM

## 2025-04-09 PROCEDURE — 3078F DIAST BP <80 MM HG: CPT | Performed by: STUDENT IN AN ORGANIZED HEALTH CARE EDUCATION/TRAINING PROGRAM

## 2025-04-09 PROCEDURE — 1159F MED LIST DOCD IN RCRD: CPT | Performed by: STUDENT IN AN ORGANIZED HEALTH CARE EDUCATION/TRAINING PROGRAM

## 2025-04-09 NOTE — PROGRESS NOTES
Chief complaint:  Post-op (2 week)  Referring physician:  No ref. provider found     SUBJECTIVE:  HPI:  Jase Amato is a 89 y.o. male with a history of HTN, afib on Xarelto, NYHA class 2 HFpEF on furosemide 40mg TID, anxiety, prostate cancer s/p XRT >10y ago, BPH s/p green light >10y ago who presents for wound check after circumcision.    Some discomfort after surgery, changed type of underwear, tolerating well.    3/5 - Has significant volume nocturia, largely related to furosemide timing. Concerned that foreskin not retractable, irregular stream, inability to adequately perform routine hygiene. Some concern that trapping of glans by phimosis is causing penile length loss.     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), Personal history of diseases of the blood and blood-forming organs and certain disorders involving the immune mechanism (09/02/2021), Personal history of diseases of the blood and blood-forming organs and certain disorders involving the immune mechanism (05/09/2018), Personal history of malignant neoplasm of prostate, Personal history of other benign neoplasm (11/04/2021), Personal history of other diseases of the circulatory system (08/11/2022), Personal history of other diseases of the circulatory system, Personal history of other diseases of the circulatory system, Personal history of other diseases of the circulatory system, Personal history of other diseases of the  nervous system and sense organs (08/30/2022), Personal history of other endocrine, nutritional and metabolic disease, Personal history of other specified conditions (05/18/2022), Personal history of other specified conditions, and Unspecified fall, subsequent encounter (01/06/2022).   Surgical history:   has a past surgical history that includes Prostate surgery (01/25/2018); MR angio head wo IV contrast (7/28/2022); and MR angio neck wo IV contrast (7/28/2022).  Family history:  family history is not on file.  Social history:   reports that he has never smoked. He has never used smokeless tobacco. He reports that he does not drink alcohol and does not use drugs.    Medications:    Current Outpatient Medications   Medication Instructions    atorvastatin (LIPITOR) 40 mg, oral, Daily    cholecalciferol (VITAMIN D3) 2,000 Units, Daily    escitalopram (LEXAPRO) 10 mg, oral, Daily    furosemide (LASIX) 40 mg, oral, 3 times weekly    latanoprost (Xalatan) 0.005 % ophthalmic solution INSTILL 1 DROP IN RIGHT EYE EVERY EVENING    lisinopril 10 mg, oral, Daily    metoprolol succinate XL (TOPROL-XL) 50 mg, oral, Daily    Xarelto 20 mg, oral, Daily      Allergies:    No Known Allergies     ROS:  14-point review of systems negative except as noted above.    OBJECTIVE:  Visit Vitals  /64   Pulse 65   Temp 36.5 °C (97.7 °F)   Body mass index is 26.69 kg/m².    Physical exam  General:  No acute distress  HEENT:  EOMI  CV:  Regular rate  Pulm:  Nonlabored respirations  Abd:  Soft, non-distended  :  s/p circumcision, narrow patent meatus, incision c/d/I with some residual dermabond material, partial buried phenotype  MSK:  No contractures  Neuro:  Motor intact  Psych:  Appropriate affect    Labs:    Lab Results   Component Value Date    WBC 5.4 03/11/2025    HGB 12.5 (L) 03/11/2025    HCT 37.2 (L) 03/11/2025     03/11/2025    ALT 28 03/11/2025    AST 16 03/11/2025     03/11/2025    K 4.9 03/11/2025      03/11/2025    CREATININE 1.22 03/11/2025    BUN 24 03/11/2025    CO2 26 03/11/2025    INR 1.0 03/27/2025    HGBA1C 5.9 (A) 07/29/2022     REFLEXIVE URINE CULTURE (no units)   Date Value   02/24/2025      Comment:     NO CULTURE INDICATED      Lab Results   Component Value Date    PSA 0.1 03/11/2025    PSA 0.12 03/15/2024       Imaging:  All imaging discussed in HPI was independently reviewed.    ASSESSMENT:  Phimosis s/p circumcision  Partial buried phallus    PLAN:  Continue with routine hygiene, educated re retracting infrapubic fat pad with each void    Follow-up 1 year    José Connors MD    Problem List Items Addressed This Visit    None  Visit Diagnoses       Phimosis    -  Primary

## 2025-04-24 ENCOUNTER — LAB (OUTPATIENT)
Dept: LAB | Facility: CLINIC | Age: 89
End: 2025-04-24
Payer: MEDICARE

## 2025-04-24 DIAGNOSIS — D50.9 IRON DEFICIENCY ANEMIA, UNSPECIFIED IRON DEFICIENCY ANEMIA TYPE: ICD-10-CM

## 2025-04-24 LAB
ALBUMIN SERPL BCP-MCNC: 3.8 G/DL (ref 3.4–5)
ALP SERPL-CCNC: 80 U/L (ref 33–136)
ALT SERPL W P-5'-P-CCNC: 21 U/L (ref 10–52)
ANION GAP SERPL CALC-SCNC: 9 MMOL/L (ref 10–20)
APTT PPP: 33 SECONDS (ref 26–36)
AST SERPL W P-5'-P-CCNC: 17 U/L (ref 9–39)
BASOPHILS # BLD AUTO: 0.02 X10*3/UL (ref 0–0.1)
BASOPHILS NFR BLD AUTO: 0.4 %
BILIRUB SERPL-MCNC: 0.6 MG/DL (ref 0–1.2)
BUN SERPL-MCNC: 24 MG/DL (ref 6–23)
CALCIUM SERPL-MCNC: 8.8 MG/DL (ref 8.6–10.3)
CHLORIDE SERPL-SCNC: 103 MMOL/L (ref 98–107)
CO2 SERPL-SCNC: 28 MMOL/L (ref 21–32)
CREAT SERPL-MCNC: 1.28 MG/DL (ref 0.5–1.3)
EGFRCR SERPLBLD CKD-EPI 2021: 54 ML/MIN/1.73M*2
EOSINOPHIL # BLD AUTO: 0.11 X10*3/UL (ref 0–0.4)
EOSINOPHIL NFR BLD AUTO: 2 %
ERYTHROCYTE [DISTWIDTH] IN BLOOD BY AUTOMATED COUNT: 13.2 % (ref 11.5–14.5)
FERRITIN SERPL-MCNC: 90 NG/ML (ref 20–300)
GLUCOSE SERPL-MCNC: 141 MG/DL (ref 74–99)
HCT VFR BLD AUTO: 35.1 % (ref 41–52)
HGB BLD-MCNC: 11.8 G/DL (ref 13.5–17.5)
IMM GRANULOCYTES # BLD AUTO: 0.02 X10*3/UL (ref 0–0.5)
IMM GRANULOCYTES NFR BLD AUTO: 0.4 % (ref 0–0.9)
INR PPP: 1.2 (ref 0.9–1.1)
IRON SATN MFR SERPL: 35 % (ref 25–45)
IRON SERPL-MCNC: 99 UG/DL (ref 35–150)
LYMPHOCYTES # BLD AUTO: 1.7 X10*3/UL (ref 0.8–3)
LYMPHOCYTES NFR BLD AUTO: 30.4 %
MCH RBC QN AUTO: 34.3 PG (ref 26–34)
MCHC RBC AUTO-ENTMCNC: 33.6 G/DL (ref 32–36)
MCV RBC AUTO: 102 FL (ref 80–100)
MONOCYTES # BLD AUTO: 0.49 X10*3/UL (ref 0.05–0.8)
MONOCYTES NFR BLD AUTO: 8.8 %
NEUTROPHILS # BLD AUTO: 3.26 X10*3/UL (ref 1.6–5.5)
NEUTROPHILS NFR BLD AUTO: 58 %
NRBC BLD-RTO: 0 /100 WBCS (ref 0–0)
PLATELET # BLD AUTO: 215 X10*3/UL (ref 150–450)
POTASSIUM SERPL-SCNC: 4.2 MMOL/L (ref 3.5–5.3)
PROT SERPL-MCNC: 6.2 G/DL (ref 6.4–8.2)
PROTHROMBIN TIME: 13.5 SECONDS (ref 9.8–12.4)
RBC # BLD AUTO: 3.44 X10*6/UL (ref 4.5–5.9)
SODIUM SERPL-SCNC: 136 MMOL/L (ref 136–145)
TIBC SERPL-MCNC: 283 UG/DL (ref 240–445)
UIBC SERPL-MCNC: 184 UG/DL (ref 110–370)
WBC # BLD AUTO: 5.6 X10*3/UL (ref 4.4–11.3)

## 2025-04-24 PROCEDURE — 83540 ASSAY OF IRON: CPT

## 2025-04-24 PROCEDURE — 85025 COMPLETE CBC W/AUTO DIFF WBC: CPT

## 2025-04-24 PROCEDURE — 85610 PROTHROMBIN TIME: CPT | Performed by: STUDENT IN AN ORGANIZED HEALTH CARE EDUCATION/TRAINING PROGRAM

## 2025-04-24 PROCEDURE — 80053 COMPREHEN METABOLIC PANEL: CPT

## 2025-04-24 PROCEDURE — 82728 ASSAY OF FERRITIN: CPT | Mod: PARLAB

## 2025-04-24 PROCEDURE — 36415 COLL VENOUS BLD VENIPUNCTURE: CPT

## 2025-05-01 ENCOUNTER — OFFICE VISIT (OUTPATIENT)
Dept: HEMATOLOGY/ONCOLOGY | Facility: CLINIC | Age: 89
End: 2025-05-01
Payer: MEDICARE

## 2025-05-01 VITALS
RESPIRATION RATE: 20 BRPM | HEART RATE: 52 BPM | OXYGEN SATURATION: 99 % | TEMPERATURE: 97.3 F | SYSTOLIC BLOOD PRESSURE: 146 MMHG | DIASTOLIC BLOOD PRESSURE: 81 MMHG | WEIGHT: 187.39 LBS | BODY MASS INDEX: 26.14 KG/M2

## 2025-05-01 DIAGNOSIS — D47.2 MGUS (MONOCLONAL GAMMOPATHY OF UNKNOWN SIGNIFICANCE): Primary | ICD-10-CM

## 2025-05-01 DIAGNOSIS — D50.9 IRON DEFICIENCY ANEMIA, UNSPECIFIED IRON DEFICIENCY ANEMIA TYPE: ICD-10-CM

## 2025-05-01 PROCEDURE — 99213 OFFICE O/P EST LOW 20 MIN: CPT | Performed by: INTERNAL MEDICINE

## 2025-05-01 PROCEDURE — 1157F ADVNC CARE PLAN IN RCRD: CPT | Performed by: INTERNAL MEDICINE

## 2025-05-01 PROCEDURE — 3079F DIAST BP 80-89 MM HG: CPT | Performed by: INTERNAL MEDICINE

## 2025-05-01 PROCEDURE — 1126F AMNT PAIN NOTED NONE PRSNT: CPT | Performed by: INTERNAL MEDICINE

## 2025-05-01 PROCEDURE — 3077F SYST BP >= 140 MM HG: CPT | Performed by: INTERNAL MEDICINE

## 2025-05-01 PROCEDURE — 1159F MED LIST DOCD IN RCRD: CPT | Performed by: INTERNAL MEDICINE

## 2025-05-01 ASSESSMENT — PAIN SCALES - GENERAL: PAINLEVEL_OUTOF10: 0-NO PAIN

## 2025-05-01 NOTE — PROGRESS NOTES
LOCATION:  [facility].     HEMATOLOGY & ONCOLOGY PROBLEMS:  1. Anemia      a. Iron deficiency due to poor oral iron absorption.      b. Maintained on intermittent IV iron infusions.  2. IgG Kappa monoclonal gammopathy (MGUS).     a. Currently being followed by close observation.       CHIEF COMPLAINT:    The patient is in the clinic today for management of anemia and monoclonal  gammopathy.               HISTORY:   Mr. Amato is 88 year old pleasant gentleman with multiple medical problems, who has also been noted to have chronic anemia. He was admitted at [facility] in Dec 2017 with CHF complications  and was also noted to be profoundly anemic with iron deficiency. EGD mainly showed a hiatal hernia and a colonoscopy showed a tubular adenoma without any evidence of further bleeding lesions. He was supported with IV iron infusion with Venofer. Follow-up  blood work from April 2018 showed persistent anemia with hemoglobin of 11.3. Ferritin was low at 11 and creatinine was 1.5. Post discharge, he was maintained on oral iron but with suboptimal response. He has been supported with intermittent courses of  IV iron infusion as an outpatient with Feraheme with good response. He was also been noted to have incidental monoclonal gammopathy during evaluation for anemia. Workup was suggestive of MGUS and is being followed with close observation without any intervention.  He had repeat extensive GI evaluation in the recent past.  Colonoscopy was unremarkable.  He had capsule endoscopy which was concerning for small intestinal bleed and there was concern regarding small intestinal mass.  He eventually had a CT enterography  which was essentially unremarkable.     INTERVAL HISTORY:  Denies any specific complaints.  Overall feeling better.  No other localizing sign and symptoms.     PAST MEDICAL HISTORY:   1. CHF.  2. A. fib.  3. Right bundle-branch block.  4. Hypertension  5.  Hyperlipidemia  6. Chronic kidney disease  7. GERD  8. Carotid stenosis  9. BPH s/p laser surgery.  10. History of prostate cancer status post external beam radiation treatment in 2015.     SOCIAL HISTORY:    and lives in [place]. His wife  in  and they were  for 62 years.  Nonsmoker and nonalcoholic. He is a retired  from [employer].     FAMILY HISTORY:    Father  at age 60 from myocardial infarction. Mother  at age 90 from cancer and patient not aware of the details. 5 children, 4 grand children ~ alive and well. One of his daughter has history of brain tumor.  No other  specific history of bleeding, clotting or malignant disorders in the family.     REVIEW OF SYSTEMS:  Pertinent finding as per the history above.  All other systems have been reviewed and generally negative and noncontributory.     PHYSICAL EXAMINATION:    Detailed examination not done.      ALLERGY & MEDICATIONS:  Allergies and latest outpatient medications list were reviewed in the EMR.    LAB RESULTS:  Latest CBC from 2025 showed a white cell count of 5.6 and hemoglobin of 11.8 and platelets of 215.  Metabolic profile was unremarkable other than glucose of 141.  Iron percentage saturation was 35 with ferritin of 90.  Last SPEP from 2024 showed stable 0.5 g of IgG kappa monoclonal protein.  Free light chain levels were stable.  Last 3 sets of blood work were reviewed and the trend was noted.     ASSESSMENT AND PLAN:   1. Anemia. Please refer to the details of initial workup and management as outlined above. In summary he has been noted to have persistent iron  deficiency of unclear etiology. GI workup including EGD and colonoscopy was unremarkable in Dec 2017. He was supported with a course of IV iron infusion with Venofer in Dec 2017 but follow up labs from 2018 showed persistent iron deficiency. Additional  hematological evaluation revealed normal vitamin B12, folate, LDH,  SPEP, haptoglobin and reticulocyte count etc. He had repeat extensive GI evaluation in the recent past.  Colonoscopy was unremarkable.  He had capsule endoscopy which was concerning for  small intestinal bleed and there was concern regarding small intestinal mass.  He eventually had a CT enterography which was essentially unremarkable. He has been supported with intermittent courses of IV iron infusion as an outpatient with Feraheme with  good response.      Latest hemoglobin and iron stores are stable.  Clinically he feels fine right now.  No immediate need for IV iron infusion.  As before I again advised him to continue to follow up with GI clinic as advised by Dr. Metz.    2. MGUS. He was noted to have an incidental finding of monoclonal gammopathy during anemia workup. Overall findings are suggestive of MGUS and he is being followed with close observation without any intervention.      3. Follow up:  Patient will followup with me in 3 months.   Advised to contact us immediately if there are any new questions or problems.        This note has been transcribed using Dragon voice recognition system and there is a possibility of unintentional typing misprints.

## 2025-05-28 ENCOUNTER — APPOINTMENT (OUTPATIENT)
Dept: CARDIOLOGY | Facility: CLINIC | Age: 89
End: 2025-05-28
Payer: MEDICARE

## 2025-06-03 ENCOUNTER — OFFICE VISIT (OUTPATIENT)
Dept: CARDIOLOGY | Facility: CLINIC | Age: 89
End: 2025-06-03
Payer: MEDICARE

## 2025-06-03 VITALS
HEART RATE: 62 BPM | HEIGHT: 71 IN | DIASTOLIC BLOOD PRESSURE: 62 MMHG | OXYGEN SATURATION: 97 % | SYSTOLIC BLOOD PRESSURE: 118 MMHG | WEIGHT: 188 LBS | BODY MASS INDEX: 26.32 KG/M2

## 2025-06-03 DIAGNOSIS — I50.32 CHRONIC DIASTOLIC CONGESTIVE HEART FAILURE: ICD-10-CM

## 2025-06-03 DIAGNOSIS — N18.4 STAGE 4 CHRONIC KIDNEY DISEASE (MULTI): ICD-10-CM

## 2025-06-03 DIAGNOSIS — I45.10 COMPLETE RIGHT BUNDLE BRANCH BLOCK: ICD-10-CM

## 2025-06-03 DIAGNOSIS — I50.30 NYHA CLASS 2 HEART FAILURE WITH PRESERVED EJECTION FRACTION: ICD-10-CM

## 2025-06-03 DIAGNOSIS — I10 ESSENTIAL HYPERTENSION: ICD-10-CM

## 2025-06-03 DIAGNOSIS — I48.0 PAROXYSMAL ATRIAL FIBRILLATION (MULTI): Primary | ICD-10-CM

## 2025-06-03 DIAGNOSIS — I42.9 CARDIOMYOPATHY, UNSPECIFIED TYPE (MULTI): ICD-10-CM

## 2025-06-03 PROBLEM — H60.509 ACUTE OTITIS EXTERNA: Status: RESOLVED | Noted: 2024-02-05 | Resolved: 2025-06-03

## 2025-06-03 LAB
P OFFSET: 194 MS
P ONSET: 129 MS
Q ONSET: 222 MS
QRS COUNT: 8 BEATS
QRS DURATION: 154 MS
QT INTERVAL: 480 MS
QTC CALCULATION(BAZETT): 446 MS
QTC FREDERICIA: 457 MS
R AXIS: 35 DEGREES
T AXIS: -85 DEGREES
T OFFSET: 462 MS
VENTRICULAR RATE: 52 BPM

## 2025-06-03 PROCEDURE — 3074F SYST BP LT 130 MM HG: CPT | Performed by: INTERNAL MEDICINE

## 2025-06-03 PROCEDURE — 1036F TOBACCO NON-USER: CPT | Performed by: INTERNAL MEDICINE

## 2025-06-03 PROCEDURE — 3078F DIAST BP <80 MM HG: CPT | Performed by: INTERNAL MEDICINE

## 2025-06-03 PROCEDURE — 99212 OFFICE O/P EST SF 10 MIN: CPT | Performed by: INTERNAL MEDICINE

## 2025-06-03 PROCEDURE — 99214 OFFICE O/P EST MOD 30 MIN: CPT | Performed by: INTERNAL MEDICINE

## 2025-06-03 PROCEDURE — 93005 ELECTROCARDIOGRAM TRACING: CPT | Performed by: INTERNAL MEDICINE

## 2025-06-03 PROCEDURE — 1160F RVW MEDS BY RX/DR IN RCRD: CPT | Performed by: INTERNAL MEDICINE

## 2025-06-03 PROCEDURE — 93010 ELECTROCARDIOGRAM REPORT: CPT | Performed by: INTERNAL MEDICINE

## 2025-06-03 PROCEDURE — 1159F MED LIST DOCD IN RCRD: CPT | Performed by: INTERNAL MEDICINE

## 2025-06-03 NOTE — PROGRESS NOTES
"  Subjective  Jase Amato  is a 89 y.o. year old male who presents for F/U paroxysmal atrail fibrillation.  No chest pain, no dyspnea, no palpitations, no edema.  Still works uot 7 days a week    Blood pressure 118/62, pulse 62, height 1.803 m (5' 11\"), weight 85.3 kg (188 lb), SpO2 97%.   Patient has no known allergies.  Medical History[1]  Surgical History[2]  Family History[3]  @SOC    Current Medications[4]     ROS  Review of Systems   All other systems reviewed and are negative.      Physical Exam  Physical Exam  Constitutional:       Appearance: Normal appearance.   HENT:      Head: Normocephalic and atraumatic.   Cardiovascular:      Rate and Rhythm: Normal rate and regular rhythm.      Comments: S2 widely split  Pulmonary:      Effort: Pulmonary effort is normal.      Breath sounds: Normal breath sounds.   Abdominal:      General: Abdomen is flat.   Musculoskeletal:      Right lower leg: No edema.      Left lower leg: No edema.   Skin:     General: Skin is warm and dry.   Neurological:      General: No focal deficit present.      Mental Status: He is alert and oriented to person, place, and time.   Psychiatric:         Mood and Affect: Mood normal.         Behavior: Behavior normal.          EKG  Encounter Date: 06/03/25   ECG 12 Lead   Result Value    Ventricular Rate 52    QRS Duration 154    QT Interval 480    QTC Calculation(Bazett) 446    R Axis 35    T Axis -85    QRS Count 8    Q Onset 222    P Onset 129    P Offset 194    T Offset 462    QTC Fredericia 457    Narrative    Wide QRS rhythm  Right bundle branch block  Abnormal ECG  When compared with ECG of 09-FEB-2025 11:39,  Wide QRS rhythm has replaced Sinus rhythm       Problem List Items Addressed This Visit       Chronic diastolic congestive heart failure    Complete right bundle branch block    Paroxysmal atrial fibrillation (Multi) - Primary    Relevant Orders    ECG 12 Lead (Completed)    Stage 4 chronic kidney disease (Multi)    NYHA " class 2 heart failure with preserved ejection fraction    7/29/22 echocardiogram LVEF = 65-70% with mild to moderate aortic insufficiency         Essential hypertension    Cardiomyopathy    Tachycardia related, resolved              Same meds  Return 3 months with EKG      Mike Gutiérrez MD        [1]   Past Medical History:  Diagnosis Date    Chronic systolic (congestive) heart failure     Chronic systolic congestive heart failure    Dilated cardiomyopathy (Multi)     Secondary dilated cardiomyopathy    Dizziness and giddiness     Light headedness    Encounter for follow-up examination after completed treatment for conditions other than malignant neoplasm 08/09/2022    Hospital discharge follow-up    Encounter for preprocedural laboratory examination     Pre-procedure lab exam    Essential (primary) hypertension     Benign essential HTN    Gastrointestinal hemorrhage, unspecified 11/04/2021    Chronic upper gastrointestinal bleeding    Generalized anxiety disorder 08/30/2022    Anxiety, generalized    Impacted cerumen, left ear 08/30/2022    Hearing loss due to cerumen impaction, left    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    Iron deficiency anemia due to chronic blood loss    Personal history of diseases of the blood and blood-forming organs and certain disorders involving the immune mechanism 09/02/2021    History of anemia    Personal history of diseases of the blood and blood-forming organs and certain disorders involving the immune mechanism 05/09/2018    History of iron deficiency anemia    Personal history of malignant neoplasm of prostate     History of malignant neoplasm of prostate    Personal history of other benign neoplasm 11/04/2021    History of other benign neoplasm    Personal history of other diseases of the circulatory system 08/11/2022    History of carotid artery stenosis    Personal history of other diseases of the circulatory system     History of  cardiomyopathy    Personal history of other diseases of the circulatory system     Atrial fibrillation, currently in sinus rhythm    Personal history of other diseases of the circulatory system     History of atrial fibrillation    Personal history of other diseases of the nervous system and sense organs 08/30/2022    History of acute otitis externa    Personal history of other endocrine, nutritional and metabolic disease     History of hyperlipidemia    Personal history of other specified conditions 05/18/2022    History of palpitations    Personal history of other specified conditions     History of urinary retention    Unspecified fall, subsequent encounter 01/06/2022    Accidental fall, subsequent encounter   [2]   Past Surgical History:  Procedure Laterality Date    MR HEAD ANGIO WO IV CONTRAST  7/28/2022    MR HEAD ANGIO WO IV CONTRAST 7/28/2022 [facility]    MR NECK ANGIO WO IV CONTRAST  7/28/2022    MR NECK ANGIO WO IV CONTRAST 7/28/2022 [facility]    PROSTATE SURGERY  01/25/2018    Prostate Surgery   [3] No family history on file.  [4]   Current Outpatient Medications   Medication Sig Dispense Refill    atorvastatin (Lipitor) 40 mg tablet TAKE 1 TABLET BY MOUTH DAILY 90 tablet 3    cholecalciferol (Vitamin D3) 50 mcg (2,000 unit) capsule Take 1 capsule (2,000 Units) by mouth once daily.      escitalopram (Lexapro) 10 mg tablet Take 1 tablet (10 mg) by mouth once daily. 90 tablet 3    furosemide (Lasix) 40 mg tablet TAKE 1 TABLET BY MOUTH 3 TIMES  WEEKLY 39 tablet 3    latanoprost (Xalatan) 0.005 % ophthalmic solution INSTILL 1 DROP IN RIGHT EYE EVERY EVENING      lisinopril 10 mg tablet Take 1 tablet (10 mg) by mouth once daily. 90 tablet 3    metoprolol succinate XL (Toprol-XL) 50 mg 24 hr tablet TAKE 1 TABLET BY MOUTH DAILY 90 tablet 3    Xarelto 20 mg tablet TAKE 1 TABLET BY MOUTH DAILY 90 tablet 3     No current facility-administered medications for this visit.

## 2025-07-24 ENCOUNTER — LAB (OUTPATIENT)
Dept: LAB | Facility: CLINIC | Age: 89
End: 2025-07-24
Payer: MEDICARE

## 2025-07-24 DIAGNOSIS — D47.2 MGUS (MONOCLONAL GAMMOPATHY OF UNKNOWN SIGNIFICANCE): ICD-10-CM

## 2025-07-24 DIAGNOSIS — D50.9 IRON DEFICIENCY ANEMIA, UNSPECIFIED IRON DEFICIENCY ANEMIA TYPE: ICD-10-CM

## 2025-07-24 LAB
ALBUMIN SERPL BCP-MCNC: 4 G/DL (ref 3.4–5)
ALP SERPL-CCNC: 73 U/L (ref 33–136)
ALT SERPL W P-5'-P-CCNC: 12 U/L (ref 10–52)
ANION GAP SERPL CALC-SCNC: 11 MMOL/L (ref 10–20)
AST SERPL W P-5'-P-CCNC: 14 U/L (ref 9–39)
BASOPHILS # BLD AUTO: 0.02 X10*3/UL (ref 0–0.1)
BASOPHILS NFR BLD AUTO: 0.4 %
BILIRUB SERPL-MCNC: 0.7 MG/DL (ref 0–1.2)
BUN SERPL-MCNC: 24 MG/DL (ref 6–23)
CALCIUM SERPL-MCNC: 8.7 MG/DL (ref 8.6–10.3)
CHLORIDE SERPL-SCNC: 103 MMOL/L (ref 98–107)
CO2 SERPL-SCNC: 26 MMOL/L (ref 21–32)
CREAT SERPL-MCNC: 1.33 MG/DL (ref 0.5–1.3)
EGFRCR SERPLBLD CKD-EPI 2021: 51 ML/MIN/1.73M*2
EOSINOPHIL # BLD AUTO: 0.06 X10*3/UL (ref 0–0.4)
EOSINOPHIL NFR BLD AUTO: 1.2 %
ERYTHROCYTE [DISTWIDTH] IN BLOOD BY AUTOMATED COUNT: 12.3 % (ref 11.5–14.5)
FERRITIN SERPL-MCNC: 57 NG/ML (ref 20–300)
GLUCOSE SERPL-MCNC: 100 MG/DL (ref 74–99)
HCT VFR BLD AUTO: 35.8 % (ref 41–52)
HGB BLD-MCNC: 11.9 G/DL (ref 13.5–17.5)
IGA SERPL-MCNC: 123 MG/DL (ref 70–400)
IGG SERPL-MCNC: 1170 MG/DL (ref 700–1600)
IGM SERPL-MCNC: 67 MG/DL (ref 40–230)
IMM GRANULOCYTES # BLD AUTO: 0.03 X10*3/UL (ref 0–0.5)
IMM GRANULOCYTES NFR BLD AUTO: 0.6 % (ref 0–0.9)
IRON SATN MFR SERPL: 33 % (ref 25–45)
IRON SERPL-MCNC: 102 UG/DL (ref 35–150)
LYMPHOCYTES # BLD AUTO: 1.31 X10*3/UL (ref 0.8–3)
LYMPHOCYTES NFR BLD AUTO: 26.2 %
MCH RBC QN AUTO: 33.5 PG (ref 26–34)
MCHC RBC AUTO-ENTMCNC: 33.2 G/DL (ref 32–36)
MCV RBC AUTO: 101 FL (ref 80–100)
MONOCYTES # BLD AUTO: 0.44 X10*3/UL (ref 0.05–0.8)
MONOCYTES NFR BLD AUTO: 8.8 %
NEUTROPHILS # BLD AUTO: 3.14 X10*3/UL (ref 1.6–5.5)
NEUTROPHILS NFR BLD AUTO: 62.8 %
NRBC BLD-RTO: 0 /100 WBCS (ref 0–0)
PLATELET # BLD AUTO: 200 X10*3/UL (ref 150–450)
POTASSIUM SERPL-SCNC: 4.2 MMOL/L (ref 3.5–5.3)
PROT SERPL-MCNC: 6.3 G/DL (ref 6.4–8.2)
PROT SERPL-MCNC: 6.4 G/DL (ref 6.4–8.2)
RBC # BLD AUTO: 3.55 X10*6/UL (ref 4.5–5.9)
SODIUM SERPL-SCNC: 136 MMOL/L (ref 136–145)
TIBC SERPL-MCNC: 309 UG/DL (ref 240–445)
UIBC SERPL-MCNC: 207 UG/DL (ref 110–370)
WBC # BLD AUTO: 5 X10*3/UL (ref 4.4–11.3)

## 2025-07-24 PROCEDURE — 84165 PROTEIN E-PHORESIS SERUM: CPT | Performed by: STUDENT IN AN ORGANIZED HEALTH CARE EDUCATION/TRAINING PROGRAM

## 2025-07-24 PROCEDURE — 84165 PROTEIN E-PHORESIS SERUM: CPT | Mod: PARLAB

## 2025-07-24 PROCEDURE — 80053 COMPREHEN METABOLIC PANEL: CPT

## 2025-07-24 PROCEDURE — 86320 SERUM IMMUNOELECTROPHORESIS: CPT | Performed by: STUDENT IN AN ORGANIZED HEALTH CARE EDUCATION/TRAINING PROGRAM

## 2025-07-24 PROCEDURE — 36415 COLL VENOUS BLD VENIPUNCTURE: CPT

## 2025-07-24 PROCEDURE — 86334 IMMUNOFIX E-PHORESIS SERUM: CPT | Mod: PARLAB

## 2025-07-24 PROCEDURE — 82784 ASSAY IGA/IGD/IGG/IGM EACH: CPT | Mod: PARLAB

## 2025-07-24 PROCEDURE — 83521 IG LIGHT CHAINS FREE EACH: CPT | Mod: PARLAB

## 2025-07-24 PROCEDURE — 82728 ASSAY OF FERRITIN: CPT | Mod: PARLAB

## 2025-07-24 PROCEDURE — 83540 ASSAY OF IRON: CPT

## 2025-07-24 PROCEDURE — 84155 ASSAY OF PROTEIN SERUM: CPT | Mod: PARLAB

## 2025-07-24 PROCEDURE — 85025 COMPLETE CBC W/AUTO DIFF WBC: CPT

## 2025-07-25 LAB
KAPPA LC SERPL-MCNC: 2.22 MG/DL (ref 0.33–1.94)
KAPPA LC/LAMBDA SER: 0.92 {RATIO} (ref 0.26–1.65)
LAMBDA LC SERPL-MCNC: 2.41 MG/DL (ref 0.57–2.63)

## 2025-07-31 LAB
ALBUMIN: 3.7 G/DL (ref 3.4–5)
ALPHA 1 GLOBULIN: 0.3 G/DL (ref 0.2–0.6)
ALPHA 2 GLOBULIN: 0.6 G/DL (ref 0.4–1.1)
BETA GLOBULIN: 0.6 G/DL (ref 0.5–1.2)
GAMMA GLOBULIN: 1.1 G/DL (ref 0.5–1.4)
IMMUNOFIXATION COMMENT: ABNORMAL
M-PROTEIN 1: 0.5 G/DL (ref ?–0)
PATH REVIEW - SERUM IMMUNOFIXATION: ABNORMAL
PATH REVIEW-SERUM PROTEIN ELECTROPHORESIS: ABNORMAL
PROTEIN ELECTROPHORESIS COMMENT: ABNORMAL

## 2025-08-01 ENCOUNTER — OFFICE VISIT (OUTPATIENT)
Dept: HEMATOLOGY/ONCOLOGY | Facility: CLINIC | Age: 89
End: 2025-08-01
Payer: MEDICARE

## 2025-08-01 VITALS
RESPIRATION RATE: 20 BRPM | BODY MASS INDEX: 26.75 KG/M2 | OXYGEN SATURATION: 100 % | TEMPERATURE: 96.1 F | WEIGHT: 191.8 LBS | HEART RATE: 54 BPM | SYSTOLIC BLOOD PRESSURE: 134 MMHG | DIASTOLIC BLOOD PRESSURE: 76 MMHG

## 2025-08-01 DIAGNOSIS — D50.8 OTHER IRON DEFICIENCY ANEMIA: Primary | ICD-10-CM

## 2025-08-01 PROCEDURE — 99213 OFFICE O/P EST LOW 20 MIN: CPT | Performed by: INTERNAL MEDICINE

## 2025-08-01 PROCEDURE — 1126F AMNT PAIN NOTED NONE PRSNT: CPT | Performed by: INTERNAL MEDICINE

## 2025-08-01 PROCEDURE — 3075F SYST BP GE 130 - 139MM HG: CPT | Performed by: INTERNAL MEDICINE

## 2025-08-01 PROCEDURE — 3078F DIAST BP <80 MM HG: CPT | Performed by: INTERNAL MEDICINE

## 2025-08-01 ASSESSMENT — PAIN SCALES - GENERAL: PAINLEVEL_OUTOF10: 0-NO PAIN

## 2025-08-01 NOTE — PROGRESS NOTES
LOCATION:  [facility].     HEMATOLOGY & ONCOLOGY PROBLEMS:  1. Anemia      a. Iron deficiency due to poor oral iron absorption.      b. Maintained on intermittent IV iron infusions.  2. IgG Kappa monoclonal gammopathy (MGUS).     a. Currently being followed by close observation.       CHIEF COMPLAINT:    The patient is in the clinic today for management of anemia and monoclonal  gammopathy.                 HISTORY:   Mr. Amato is 89 year old pleasant gentleman with multiple medical problems, who has also been noted to have chronic anemia. He was admitted at [facility] in Dec 2017 with CHF complications  and was also noted to be profoundly anemic with iron deficiency. EGD mainly showed a hiatal hernia and a colonoscopy showed a tubular adenoma without any evidence of further bleeding lesions. He was supported with IV iron infusion with Venofer. Follow-up  blood work from April 2018 showed persistent anemia with hemoglobin of 11.3. Ferritin was low at 11 and creatinine was 1.5. Post discharge, he was maintained on oral iron but with suboptimal response. He has been supported with intermittent courses of  IV iron infusion as an outpatient with Feraheme with good response. He was also been noted to have incidental monoclonal gammopathy during evaluation for anemia. Workup was suggestive of MGUS and is being followed with close observation without any intervention.  He had repeat extensive GI evaluation in the recent past.  Colonoscopy was unremarkable.  He had capsule endoscopy which was concerning for small intestinal bleed and there was concern regarding small intestinal mass.  He eventually had a CT enterography  which was essentially unremarkable.     INTERVAL HISTORY:  Denies any specific complaints.  Overall feeling better.  No other localizing sign and symptoms.     PAST MEDICAL HISTORY:   1. CHF.  2. A. fib.  3. Right bundle-branch block.  4. Hypertension  5.  Hyperlipidemia  6. Chronic kidney disease  7. GERD  8. Carotid stenosis  9. BPH s/p laser surgery.  10. History of prostate cancer status post external beam radiation treatment in 2015.     SOCIAL HISTORY:    and lives in [place]. His wife  in  and they were  for 62 years.  Nonsmoker and nonalcoholic. He is a retired  from [employer].     FAMILY HISTORY:    Father  at age 60 from myocardial infarction. Mother  at age 90 from cancer and patient not aware of the details. 5 children, 4 grand children ~ alive and well. One of his daughter has history of brain tumor.  No other  specific history of bleeding, clotting or malignant disorders in the family.     REVIEW OF SYSTEMS:  Pertinent finding as per the history above.  All other systems have been reviewed and generally negative and noncontributory.     PHYSICAL EXAMINATION:    Detailed examination not done.      ALLERGY & MEDICATIONS:  Allergies and latest outpatient medications list were reviewed in the EMR.    LAB RESULTS:  Latest CBC from 2025 showed a white cell count of 5.0 and hemoglobin of 11.9 and platelets of 200.  Metabolic profile was unremarkable other than glucose of 100.  Iron percentage saturation was 33 with ferritin of 57.   SPEP  showed stable 0.5 g of IgG kappa monoclonal protein.  Free light chain levels were stable.  Last 3 sets of blood work were reviewed and the trend was noted.     ASSESSMENT AND PLAN:   1. Anemia. Please refer to the details of initial workup and management as outlined above. In summary he has been noted to have persistent iron  deficiency of unclear etiology. GI workup including EGD and colonoscopy was unremarkable in Dec 2017. He was supported with a course of IV iron infusion with Venofer in Dec 2017 but follow up labs from 2018 showed persistent iron deficiency. Additional hematological evaluation revealed normal vitamin B12, folate, LDH, SPEP, haptoglobin and  > 16 inches reticulocyte count etc. He had repeat extensive GI evaluation in the recent past.  Colonoscopy was unremarkable. He had capsule endoscopy in Mar 2021, which was concerning for  small intestinal bleed and there was concern regarding small intestinal mass.  He eventually had a CT enterography which was essentially unremarkable. He has been supported with intermittent courses of IV iron infusion as an outpatient with Feraheme with  good response.      Latest hemoglobin and iron stores are stable.  Clinically he feels fine right now.  No immediate need for IV iron infusion.  As before I again advised him to continue to follow up with GI clinic as advised by Dr. Metz.    2. MGUS. He was noted to have an incidental finding of monoclonal gammopathy during anemia workup. Overall findings are suggestive of MGUS and he is being followed with close observation without any intervention.      3. Follow up:  Patient will followup with me in 3 months.   Advised to contact us immediately if there are any new questions or problems.        This note has been transcribed using Dragon voice recognition system and there is a possibility of unintentional typing misprints.

## 2025-08-20 ENCOUNTER — TELEPHONE (OUTPATIENT)
Dept: HEMATOLOGY/ONCOLOGY | Facility: CLINIC | Age: 89
End: 2025-08-20
Payer: MEDICARE

## 2025-09-04 ENCOUNTER — OFFICE VISIT (OUTPATIENT)
Dept: CARDIOLOGY | Facility: CLINIC | Age: 89
End: 2025-09-04
Payer: MEDICARE

## 2025-09-04 VITALS
DIASTOLIC BLOOD PRESSURE: 82 MMHG | HEIGHT: 71 IN | OXYGEN SATURATION: 98 % | WEIGHT: 184.2 LBS | SYSTOLIC BLOOD PRESSURE: 122 MMHG | BODY MASS INDEX: 25.79 KG/M2 | HEART RATE: 65 BPM

## 2025-09-04 DIAGNOSIS — I10 ESSENTIAL HYPERTENSION: ICD-10-CM

## 2025-09-04 DIAGNOSIS — I50.30 NYHA CLASS 2 HEART FAILURE WITH PRESERVED EJECTION FRACTION: ICD-10-CM

## 2025-09-04 DIAGNOSIS — I45.10 COMPLETE RIGHT BUNDLE BRANCH BLOCK: ICD-10-CM

## 2025-09-04 DIAGNOSIS — I48.0 PAROXYSMAL ATRIAL FIBRILLATION (MULTI): Primary | ICD-10-CM

## 2025-09-04 DIAGNOSIS — I50.32 CHRONIC DIASTOLIC CONGESTIVE HEART FAILURE: ICD-10-CM

## 2025-09-04 DIAGNOSIS — I42.9 CARDIOMYOPATHY, UNSPECIFIED TYPE (MULTI): ICD-10-CM

## 2025-09-04 PROCEDURE — 93005 ELECTROCARDIOGRAM TRACING: CPT | Performed by: INTERNAL MEDICINE

## 2025-09-04 PROCEDURE — 1036F TOBACCO NON-USER: CPT | Performed by: INTERNAL MEDICINE

## 2025-09-04 PROCEDURE — 93010 ELECTROCARDIOGRAM REPORT: CPT | Performed by: INTERNAL MEDICINE

## 2025-09-04 PROCEDURE — 1160F RVW MEDS BY RX/DR IN RCRD: CPT | Performed by: INTERNAL MEDICINE

## 2025-09-04 PROCEDURE — 99214 OFFICE O/P EST MOD 30 MIN: CPT | Performed by: INTERNAL MEDICINE

## 2025-09-04 PROCEDURE — 3074F SYST BP LT 130 MM HG: CPT | Performed by: INTERNAL MEDICINE

## 2025-09-04 PROCEDURE — 1159F MED LIST DOCD IN RCRD: CPT | Performed by: INTERNAL MEDICINE

## 2025-09-04 PROCEDURE — 99212 OFFICE O/P EST SF 10 MIN: CPT | Mod: 25

## 2025-09-04 PROCEDURE — 3078F DIAST BP <80 MM HG: CPT | Performed by: INTERNAL MEDICINE

## 2025-09-06 LAB
ATRIAL RATE: 50 BPM
P AXIS: 73 DEGREES
P OFFSET: 160 MS
P ONSET: 123 MS
PR INTERVAL: 198 MS
Q ONSET: 222 MS
QRS COUNT: 8 BEATS
QRS DURATION: 158 MS
QT INTERVAL: 512 MS
QTC CALCULATION(BAZETT): 466 MS
QTC FREDERICIA: 482 MS
R AXIS: 38 DEGREES
T AXIS: 4 DEGREES
T OFFSET: 478 MS
VENTRICULAR RATE: 50 BPM

## 2025-09-16 ENCOUNTER — APPOINTMENT (OUTPATIENT)
Dept: PRIMARY CARE | Facility: CLINIC | Age: 89
End: 2025-09-16
Payer: MEDICARE

## 2025-11-03 ENCOUNTER — APPOINTMENT (OUTPATIENT)
Dept: HEMATOLOGY/ONCOLOGY | Facility: CLINIC | Age: 89
End: 2025-11-03
Payer: MEDICARE

## 2026-04-13 ENCOUNTER — APPOINTMENT (OUTPATIENT)
Dept: UROLOGY | Facility: CLINIC | Age: OVER 89
End: 2026-04-13
Payer: MEDICARE

## (undated) DEVICE — Device

## (undated) DEVICE — APPLICATOR, CHLORAPREP, W/ORANGE TINT, 26ML

## (undated) DEVICE — SLEEVE, VASO PRESS, CALF GARMENT, MEDIUM, GREEN

## (undated) DEVICE — BANDAGE, STRETCH, CONFORM, 2 IN X 4.1 YD, STERILE